# Patient Record
Sex: FEMALE | Race: WHITE | URBAN - METROPOLITAN AREA
[De-identification: names, ages, dates, MRNs, and addresses within clinical notes are randomized per-mention and may not be internally consistent; named-entity substitution may affect disease eponyms.]

---

## 2017-01-25 ENCOUNTER — OUTPATIENT (OUTPATIENT)
Dept: OUTPATIENT SERVICES | Facility: HOSPITAL | Age: 82
LOS: 1 days | Discharge: HOME | End: 2017-01-25

## 2017-01-27 DIAGNOSIS — M06.9 RHEUMATOID ARTHRITIS, UNSPECIFIED: ICD-10-CM

## 2017-02-01 ENCOUNTER — OUTPATIENT (OUTPATIENT)
Dept: OUTPATIENT SERVICES | Facility: HOSPITAL | Age: 82
LOS: 1 days | Discharge: HOME | End: 2017-02-01

## 2017-02-01 DIAGNOSIS — M06.9 RHEUMATOID ARTHRITIS, UNSPECIFIED: ICD-10-CM

## 2017-02-06 ENCOUNTER — APPOINTMENT (OUTPATIENT)
Dept: RHEUMATOLOGY | Facility: CLINIC | Age: 82
End: 2017-02-06

## 2017-02-06 VITALS
SYSTOLIC BLOOD PRESSURE: 120 MMHG | DIASTOLIC BLOOD PRESSURE: 60 MMHG | HEIGHT: 60 IN | BODY MASS INDEX: 21.99 KG/M2 | WEIGHT: 112 LBS

## 2017-02-06 DIAGNOSIS — M48.06 SPINAL STENOSIS, LUMBAR REGION: ICD-10-CM

## 2017-02-08 VITALS
RESPIRATION RATE: 16 BRPM | DIASTOLIC BLOOD PRESSURE: 48 MMHG | TEMPERATURE: 98 F | OXYGEN SATURATION: 100 % | HEART RATE: 62 BPM | SYSTOLIC BLOOD PRESSURE: 124 MMHG

## 2017-02-08 VITALS
RESPIRATION RATE: 16 BRPM | SYSTOLIC BLOOD PRESSURE: 120 MMHG | DIASTOLIC BLOOD PRESSURE: 53 MMHG | TEMPERATURE: 99 F | HEART RATE: 72 BPM | OXYGEN SATURATION: 95 %

## 2017-02-08 DIAGNOSIS — Z90.13 ACQUIRED ABSENCE OF BILATERAL BREASTS AND NIPPLES: Chronic | ICD-10-CM

## 2017-02-08 RX ORDER — RITUXIMAB 10 MG/ML
1000 INJECTION, SOLUTION INTRAVENOUS ONCE
Qty: 0 | Refills: 0 | Status: DISCONTINUED | OUTPATIENT
Start: 2017-02-08 | End: 2017-02-08

## 2017-02-08 RX ORDER — RITUXIMAB 10 MG/ML
1000 INJECTION, SOLUTION INTRAVENOUS ONCE
Qty: 0 | Refills: 0 | Status: COMPLETED | OUTPATIENT
Start: 2017-02-08 | End: 2017-02-08

## 2017-02-08 RX ORDER — ACETAMINOPHEN 500 MG
650 TABLET ORAL ONCE
Qty: 0 | Refills: 0 | Status: COMPLETED | OUTPATIENT
Start: 2017-02-08 | End: 2017-02-08

## 2017-02-08 RX ORDER — CETIRIZINE HYDROCHLORIDE 10 MG/1
10 TABLET ORAL ONCE
Qty: 0 | Refills: 0 | Status: COMPLETED | OUTPATIENT
Start: 2017-02-08 | End: 2017-02-08

## 2017-02-08 RX ADMIN — CETIRIZINE HYDROCHLORIDE 10 MILLIGRAM(S): 10 TABLET ORAL at 11:31

## 2017-02-08 RX ADMIN — Medication 60 MILLIGRAM(S): at 11:31

## 2017-02-08 RX ADMIN — Medication 650 MILLIGRAM(S): at 11:32

## 2017-02-08 RX ADMIN — RITUXIMAB 50 MILLIGRAM(S): 10 INJECTION, SOLUTION INTRAVENOUS at 12:08

## 2017-06-30 ENCOUNTER — APPOINTMENT (OUTPATIENT)
Dept: RHEUMATOLOGY | Facility: CLINIC | Age: 82
End: 2017-06-30

## 2017-07-11 ENCOUNTER — APPOINTMENT (OUTPATIENT)
Dept: RHEUMATOLOGY | Facility: CLINIC | Age: 82
End: 2017-07-11

## 2017-07-11 VITALS
SYSTOLIC BLOOD PRESSURE: 126 MMHG | OXYGEN SATURATION: 98 % | DIASTOLIC BLOOD PRESSURE: 62 MMHG | HEART RATE: 65 BPM | BODY MASS INDEX: 21.99 KG/M2 | WEIGHT: 112 LBS | HEIGHT: 60 IN

## 2017-08-16 ENCOUNTER — RX RENEWAL (OUTPATIENT)
Age: 82
End: 2017-08-16

## 2017-11-10 ENCOUNTER — APPOINTMENT (OUTPATIENT)
Dept: RHEUMATOLOGY | Facility: CLINIC | Age: 82
End: 2017-11-10
Payer: MEDICARE

## 2017-11-10 VITALS
HEIGHT: 60 IN | HEART RATE: 56 BPM | WEIGHT: 111 LBS | SYSTOLIC BLOOD PRESSURE: 122 MMHG | BODY MASS INDEX: 21.79 KG/M2 | DIASTOLIC BLOOD PRESSURE: 72 MMHG | OXYGEN SATURATION: 97 %

## 2017-11-10 PROCEDURE — 99213 OFFICE O/P EST LOW 20 MIN: CPT

## 2017-11-10 RX ORDER — RITUXIMAB 10 MG/ML
500 INJECTION, SOLUTION INTRAVENOUS
Refills: 0 | Status: DISCONTINUED | COMMUNITY
End: 2017-11-10

## 2017-11-10 RX ORDER — RITUXIMAB 10 MG/ML
500 INJECTION, SOLUTION INTRAVENOUS
Qty: 4 | Refills: 1 | Status: DISCONTINUED | COMMUNITY
Start: 2017-08-16 | End: 2017-11-10

## 2018-05-14 ENCOUNTER — APPOINTMENT (OUTPATIENT)
Dept: RHEUMATOLOGY | Facility: CLINIC | Age: 83
End: 2018-05-14
Payer: MEDICARE

## 2018-05-14 VITALS
HEIGHT: 59 IN | DIASTOLIC BLOOD PRESSURE: 74 MMHG | WEIGHT: 108 LBS | BODY MASS INDEX: 21.77 KG/M2 | HEART RATE: 105 BPM | OXYGEN SATURATION: 92 % | SYSTOLIC BLOOD PRESSURE: 157 MMHG

## 2018-05-14 PROCEDURE — 99213 OFFICE O/P EST LOW 20 MIN: CPT

## 2018-11-14 ENCOUNTER — APPOINTMENT (OUTPATIENT)
Dept: RHEUMATOLOGY | Facility: CLINIC | Age: 83
End: 2018-11-14
Payer: MEDICARE

## 2018-11-14 VITALS
DIASTOLIC BLOOD PRESSURE: 82 MMHG | BODY MASS INDEX: 22.58 KG/M2 | WEIGHT: 112 LBS | HEIGHT: 59 IN | SYSTOLIC BLOOD PRESSURE: 129 MMHG | HEART RATE: 62 BPM

## 2018-11-14 PROBLEM — M06.9 RHEUMATOID ARTHRITIS, UNSPECIFIED: Chronic | Status: ACTIVE | Noted: 2017-02-08

## 2018-11-14 PROBLEM — L40.9 PSORIASIS, UNSPECIFIED: Chronic | Status: ACTIVE | Noted: 2017-02-08

## 2018-11-14 PROBLEM — I49.9 CARDIAC ARRHYTHMIA, UNSPECIFIED: Chronic | Status: ACTIVE | Noted: 2017-02-08

## 2018-11-14 PROBLEM — E78.5 HYPERLIPIDEMIA, UNSPECIFIED: Chronic | Status: ACTIVE | Noted: 2017-02-08

## 2018-11-14 PROCEDURE — 99213 OFFICE O/P EST LOW 20 MIN: CPT

## 2018-11-16 ENCOUNTER — INPATIENT (INPATIENT)
Facility: HOSPITAL | Age: 83
LOS: 1 days | Discharge: HOME | End: 2018-11-18
Attending: FAMILY MEDICINE | Admitting: FAMILY MEDICINE
Payer: MEDICARE

## 2018-11-16 VITALS
WEIGHT: 149.91 LBS | SYSTOLIC BLOOD PRESSURE: 136 MMHG | OXYGEN SATURATION: 98 % | RESPIRATION RATE: 22 BRPM | HEART RATE: 50 BPM | DIASTOLIC BLOOD PRESSURE: 59 MMHG

## 2018-11-16 DIAGNOSIS — Z90.13 ACQUIRED ABSENCE OF BILATERAL BREASTS AND NIPPLES: Chronic | ICD-10-CM

## 2018-11-16 LAB
ALBUMIN SERPL ELPH-MCNC: 2.7 G/DL — LOW (ref 3.3–5)
ALBUMIN SERPL ELPH-MCNC: 3.2 G/DL — LOW (ref 3.3–5)
ALP SERPL-CCNC: 56 U/L — SIGNIFICANT CHANGE UP (ref 40–120)
ALP SERPL-CCNC: 88 U/L — SIGNIFICANT CHANGE UP (ref 40–120)
ALT FLD-CCNC: 17 U/L — SIGNIFICANT CHANGE UP (ref 12–78)
ALT FLD-CCNC: 21 U/L — SIGNIFICANT CHANGE UP (ref 12–78)
AMMONIA BLD-MCNC: 26 UMOL/L — SIGNIFICANT CHANGE UP (ref 11–32)
ANION GAP SERPL CALC-SCNC: 7 MMOL/L — SIGNIFICANT CHANGE UP (ref 5–17)
ANION GAP SERPL CALC-SCNC: 9 MMOL/L — SIGNIFICANT CHANGE UP (ref 5–17)
APPEARANCE UR: CLEAR — SIGNIFICANT CHANGE UP
APTT BLD: 29.1 SEC — SIGNIFICANT CHANGE UP (ref 27.5–36.3)
AST SERPL-CCNC: 22 U/L — SIGNIFICANT CHANGE UP (ref 15–37)
AST SERPL-CCNC: 28 U/L — SIGNIFICANT CHANGE UP (ref 15–37)
BACTERIA # UR AUTO: ABNORMAL
BASOPHILS # BLD AUTO: 0.05 K/UL — SIGNIFICANT CHANGE UP (ref 0–0.2)
BASOPHILS NFR BLD AUTO: 0.4 % — SIGNIFICANT CHANGE UP (ref 0–2)
BILIRUB SERPL-MCNC: 0.4 MG/DL — SIGNIFICANT CHANGE UP (ref 0.2–1.2)
BILIRUB SERPL-MCNC: 0.4 MG/DL — SIGNIFICANT CHANGE UP (ref 0.2–1.2)
BILIRUB UR-MCNC: NEGATIVE — SIGNIFICANT CHANGE UP
BUN SERPL-MCNC: 12 MG/DL — SIGNIFICANT CHANGE UP (ref 7–23)
BUN SERPL-MCNC: 20 MG/DL — SIGNIFICANT CHANGE UP (ref 7–23)
CALCIUM SERPL-MCNC: 7.1 MG/DL — LOW (ref 8.5–10.1)
CALCIUM SERPL-MCNC: 7.8 MG/DL — LOW (ref 8.5–10.1)
CHLORIDE SERPL-SCNC: 100 MMOL/L — SIGNIFICANT CHANGE UP (ref 96–108)
CHLORIDE SERPL-SCNC: 107 MMOL/L — SIGNIFICANT CHANGE UP (ref 96–108)
CK SERPL-CCNC: 152 U/L — SIGNIFICANT CHANGE UP (ref 26–192)
CO2 SERPL-SCNC: 24 MMOL/L — SIGNIFICANT CHANGE UP (ref 22–31)
CO2 SERPL-SCNC: 24 MMOL/L — SIGNIFICANT CHANGE UP (ref 22–31)
COLOR SPEC: YELLOW — SIGNIFICANT CHANGE UP
CREAT SERPL-MCNC: 0.69 MG/DL — SIGNIFICANT CHANGE UP (ref 0.5–1.3)
CREAT SERPL-MCNC: 1 MG/DL — SIGNIFICANT CHANGE UP (ref 0.5–1.3)
DIFF PNL FLD: NEGATIVE — SIGNIFICANT CHANGE UP
EOSINOPHIL # BLD AUTO: 0.22 K/UL — SIGNIFICANT CHANGE UP (ref 0–0.5)
EOSINOPHIL NFR BLD AUTO: 1.8 % — SIGNIFICANT CHANGE UP (ref 0–6)
EPI CELLS # UR: SIGNIFICANT CHANGE UP
GLUCOSE SERPL-MCNC: 163 MG/DL — HIGH (ref 70–99)
GLUCOSE SERPL-MCNC: 77 MG/DL — SIGNIFICANT CHANGE UP (ref 70–99)
GLUCOSE UR QL: NEGATIVE MG/DL — SIGNIFICANT CHANGE UP
GRAN CASTS # UR COMP ASSIST: ABNORMAL /LPF
HCT VFR BLD CALC: 39.9 % — SIGNIFICANT CHANGE UP (ref 34.5–45)
HCT VFR BLD CALC: 48.9 % — HIGH (ref 34.5–45)
HGB BLD-MCNC: 13.1 G/DL — SIGNIFICANT CHANGE UP (ref 11.5–15.5)
HGB BLD-MCNC: 15.9 G/DL — HIGH (ref 11.5–15.5)
HYALINE CASTS # UR AUTO: ABNORMAL /LPF
IMM GRANULOCYTES NFR BLD AUTO: 0.7 % — SIGNIFICANT CHANGE UP (ref 0–1.5)
INR BLD: 1.14 RATIO — SIGNIFICANT CHANGE UP (ref 0.88–1.16)
KETONES UR-MCNC: ABNORMAL
LACTATE SERPL-SCNC: 1.3 MMOL/L — SIGNIFICANT CHANGE UP (ref 0.7–2)
LEUKOCYTE ESTERASE UR-ACNC: ABNORMAL
LIDOCAIN IGE QN: 267 U/L — SIGNIFICANT CHANGE UP (ref 73–393)
LYMPHOCYTES # BLD AUTO: 1.14 K/UL — SIGNIFICANT CHANGE UP (ref 1–3.3)
LYMPHOCYTES # BLD AUTO: 9.5 % — LOW (ref 13–44)
MCHC RBC-ENTMCNC: 30 PG — SIGNIFICANT CHANGE UP (ref 27–34)
MCHC RBC-ENTMCNC: 30.1 PG — SIGNIFICANT CHANGE UP (ref 27–34)
MCHC RBC-ENTMCNC: 32.5 GM/DL — SIGNIFICANT CHANGE UP (ref 32–36)
MCHC RBC-ENTMCNC: 32.8 GM/DL — SIGNIFICANT CHANGE UP (ref 32–36)
MCV RBC AUTO: 91.5 FL — SIGNIFICANT CHANGE UP (ref 80–100)
MCV RBC AUTO: 92.4 FL — SIGNIFICANT CHANGE UP (ref 80–100)
MONOCYTES # BLD AUTO: 0.98 K/UL — HIGH (ref 0–0.9)
MONOCYTES NFR BLD AUTO: 8.1 % — SIGNIFICANT CHANGE UP (ref 2–14)
NEUTROPHILS # BLD AUTO: 9.58 K/UL — HIGH (ref 1.8–7.4)
NEUTROPHILS NFR BLD AUTO: 79.5 % — HIGH (ref 43–77)
NITRITE UR-MCNC: NEGATIVE — SIGNIFICANT CHANGE UP
NRBC # BLD: 0 /100 WBCS — SIGNIFICANT CHANGE UP (ref 0–0)
NRBC # BLD: 0 /100 WBCS — SIGNIFICANT CHANGE UP (ref 0–0)
NT-PROBNP SERPL-SCNC: 448 PG/ML — SIGNIFICANT CHANGE UP (ref 0–450)
PH UR: 5 — SIGNIFICANT CHANGE UP (ref 5–8)
PLATELET # BLD AUTO: 155 K/UL — SIGNIFICANT CHANGE UP (ref 150–400)
PLATELET # BLD AUTO: 181 K/UL — SIGNIFICANT CHANGE UP (ref 150–400)
POTASSIUM SERPL-MCNC: 4.4 MMOL/L — SIGNIFICANT CHANGE UP (ref 3.5–5.3)
POTASSIUM SERPL-MCNC: 4.5 MMOL/L — SIGNIFICANT CHANGE UP (ref 3.5–5.3)
POTASSIUM SERPL-SCNC: 4.4 MMOL/L — SIGNIFICANT CHANGE UP (ref 3.5–5.3)
POTASSIUM SERPL-SCNC: 4.5 MMOL/L — SIGNIFICANT CHANGE UP (ref 3.5–5.3)
PROT SERPL-MCNC: 5.2 GM/DL — LOW (ref 6–8.3)
PROT SERPL-MCNC: 6 GM/DL — SIGNIFICANT CHANGE UP (ref 6–8.3)
PROT UR-MCNC: 30 MG/DL
PROTHROM AB SERPL-ACNC: 12.7 SEC — SIGNIFICANT CHANGE UP (ref 10–12.9)
RBC # BLD: 4.36 M/UL — SIGNIFICANT CHANGE UP (ref 3.8–5.2)
RBC # BLD: 5.29 M/UL — HIGH (ref 3.8–5.2)
RBC # FLD: 13.5 % — SIGNIFICANT CHANGE UP (ref 10.3–14.5)
RBC # FLD: 13.5 % — SIGNIFICANT CHANGE UP (ref 10.3–14.5)
RBC CASTS # UR COMP ASSIST: SIGNIFICANT CHANGE UP /HPF (ref 0–4)
SODIUM SERPL-SCNC: 133 MMOL/L — LOW (ref 135–145)
SODIUM SERPL-SCNC: 138 MMOL/L — SIGNIFICANT CHANGE UP (ref 135–145)
SP GR SPEC: 1.02 — SIGNIFICANT CHANGE UP (ref 1.01–1.02)
T3 SERPL-MCNC: 62 NG/DL — LOW (ref 80–200)
T4 AB SER-ACNC: 4.4 UG/DL — LOW (ref 4.6–12)
TROPONIN I SERPL-MCNC: <0.015 NG/ML — SIGNIFICANT CHANGE UP (ref 0.01–0.04)
TSH SERPL-MCNC: 12.1 UU/ML — HIGH (ref 0.34–4.82)
UROBILINOGEN FLD QL: NEGATIVE MG/DL — SIGNIFICANT CHANGE UP
WBC # BLD: 12.05 K/UL — HIGH (ref 3.8–10.5)
WBC # BLD: 5.87 K/UL — SIGNIFICANT CHANGE UP (ref 3.8–10.5)
WBC # FLD AUTO: 12.05 K/UL — HIGH (ref 3.8–10.5)
WBC # FLD AUTO: 5.87 K/UL — SIGNIFICANT CHANGE UP (ref 3.8–10.5)
WBC UR QL: SIGNIFICANT CHANGE UP

## 2018-11-16 PROCEDURE — 99285 EMERGENCY DEPT VISIT HI MDM: CPT

## 2018-11-16 PROCEDURE — 95816 EEG AWAKE AND DROWSY: CPT | Mod: 26

## 2018-11-16 PROCEDURE — 70450 CT HEAD/BRAIN W/O DYE: CPT | Mod: 26

## 2018-11-16 PROCEDURE — 93010 ELECTROCARDIOGRAM REPORT: CPT

## 2018-11-16 PROCEDURE — 71045 X-RAY EXAM CHEST 1 VIEW: CPT | Mod: 26

## 2018-11-16 PROCEDURE — 93306 TTE W/DOPPLER COMPLETE: CPT | Mod: 26

## 2018-11-16 PROCEDURE — 72125 CT NECK SPINE W/O DYE: CPT | Mod: 26

## 2018-11-16 RX ORDER — ENOXAPARIN SODIUM 100 MG/ML
40 INJECTION SUBCUTANEOUS EVERY 24 HOURS
Qty: 0 | Refills: 0 | Status: DISCONTINUED | OUTPATIENT
Start: 2018-11-16 | End: 2018-11-18

## 2018-11-16 RX ORDER — PINDOLOL 10 MG
1 TABLET ORAL
Qty: 0 | Refills: 0 | COMMUNITY

## 2018-11-16 RX ORDER — ESCITALOPRAM OXALATE 10 MG/1
1 TABLET, FILM COATED ORAL
Qty: 0 | Refills: 0 | COMMUNITY

## 2018-11-16 RX ORDER — AZITHROMYCIN 500 MG/1
500 TABLET, FILM COATED ORAL ONCE
Qty: 0 | Refills: 0 | Status: COMPLETED | OUTPATIENT
Start: 2018-11-16 | End: 2018-11-16

## 2018-11-16 RX ORDER — OMEGA-3 ACID ETHYL ESTERS 1 G
1 CAPSULE ORAL
Qty: 0 | Refills: 0 | COMMUNITY

## 2018-11-16 RX ORDER — PANTOPRAZOLE SODIUM 20 MG/1
40 TABLET, DELAYED RELEASE ORAL
Qty: 0 | Refills: 0 | Status: DISCONTINUED | OUTPATIENT
Start: 2018-11-16 | End: 2018-11-18

## 2018-11-16 RX ORDER — OMEPRAZOLE 10 MG/1
1 CAPSULE, DELAYED RELEASE ORAL
Qty: 0 | Refills: 0 | COMMUNITY

## 2018-11-16 RX ORDER — ASPIRIN/CALCIUM CARB/MAGNESIUM 324 MG
1 TABLET ORAL
Qty: 0 | Refills: 0 | COMMUNITY

## 2018-11-16 RX ORDER — CEFTRIAXONE 500 MG/1
1000 INJECTION, POWDER, FOR SOLUTION INTRAMUSCULAR; INTRAVENOUS ONCE
Qty: 0 | Refills: 0 | Status: COMPLETED | OUTPATIENT
Start: 2018-11-16 | End: 2018-11-16

## 2018-11-16 RX ORDER — SIMVASTATIN 20 MG/1
1 TABLET, FILM COATED ORAL
Qty: 0 | Refills: 0 | COMMUNITY

## 2018-11-16 RX ORDER — SODIUM CHLORIDE 9 MG/ML
2500 INJECTION INTRAMUSCULAR; INTRAVENOUS; SUBCUTANEOUS ONCE
Qty: 0 | Refills: 0 | Status: COMPLETED | OUTPATIENT
Start: 2018-11-16 | End: 2018-11-16

## 2018-11-16 RX ORDER — MAGNESIUM CARBONATE 54 MG/5 ML
500 LIQUID (ML) ORAL
Qty: 0 | Refills: 0 | COMMUNITY

## 2018-11-16 RX ORDER — ASCORBIC ACID 60 MG
2 TABLET,CHEWABLE ORAL
Qty: 0 | Refills: 0 | COMMUNITY

## 2018-11-16 RX ORDER — USTEKINUMAB 45 MG/.5ML
0 INJECTION, SOLUTION SUBCUTANEOUS
Qty: 0 | Refills: 0 | COMMUNITY

## 2018-11-16 RX ORDER — CHOLECALCIFEROL (VITAMIN D3) 125 MCG
2 CAPSULE ORAL
Qty: 0 | Refills: 0 | COMMUNITY

## 2018-11-16 RX ORDER — ACETAMINOPHEN 500 MG
650 TABLET ORAL EVERY 6 HOURS
Qty: 0 | Refills: 0 | Status: DISCONTINUED | OUTPATIENT
Start: 2018-11-16 | End: 2018-11-18

## 2018-11-16 RX ORDER — CHOLECALCIFEROL (VITAMIN D3) 125 MCG
2000 CAPSULE ORAL DAILY
Qty: 0 | Refills: 0 | Status: DISCONTINUED | OUTPATIENT
Start: 2018-11-16 | End: 2018-11-18

## 2018-11-16 RX ORDER — ESCITALOPRAM OXALATE 10 MG/1
20 TABLET, FILM COATED ORAL DAILY
Qty: 0 | Refills: 0 | Status: DISCONTINUED | OUTPATIENT
Start: 2018-11-16 | End: 2018-11-18

## 2018-11-16 RX ORDER — ASPIRIN/CALCIUM CARB/MAGNESIUM 324 MG
81 TABLET ORAL DAILY
Qty: 0 | Refills: 0 | Status: DISCONTINUED | OUTPATIENT
Start: 2018-11-16 | End: 2018-11-18

## 2018-11-16 RX ADMIN — Medication 1 TABLET(S): at 12:20

## 2018-11-16 RX ADMIN — AZITHROMYCIN 255 MILLIGRAM(S): 500 TABLET, FILM COATED ORAL at 02:29

## 2018-11-16 RX ADMIN — Medication 2000 UNIT(S): at 12:25

## 2018-11-16 RX ADMIN — CEFTRIAXONE 1000 MILLIGRAM(S): 500 INJECTION, POWDER, FOR SOLUTION INTRAMUSCULAR; INTRAVENOUS at 02:24

## 2018-11-16 RX ADMIN — ENOXAPARIN SODIUM 40 MILLIGRAM(S): 100 INJECTION SUBCUTANEOUS at 12:20

## 2018-11-16 RX ADMIN — ESCITALOPRAM OXALATE 20 MILLIGRAM(S): 10 TABLET, FILM COATED ORAL at 12:20

## 2018-11-16 RX ADMIN — SODIUM CHLORIDE 5000 MILLILITER(S): 9 INJECTION INTRAMUSCULAR; INTRAVENOUS; SUBCUTANEOUS at 01:48

## 2018-11-16 RX ADMIN — AZITHROMYCIN 500 MILLIGRAM(S): 500 TABLET, FILM COATED ORAL at 03:29

## 2018-11-16 RX ADMIN — SODIUM CHLORIDE 2500 MILLILITER(S): 9 INJECTION INTRAMUSCULAR; INTRAVENOUS; SUBCUTANEOUS at 02:30

## 2018-11-16 RX ADMIN — PANTOPRAZOLE SODIUM 40 MILLIGRAM(S): 20 TABLET, DELAYED RELEASE ORAL at 18:54

## 2018-11-16 RX ADMIN — Medication 81 MILLIGRAM(S): at 12:20

## 2018-11-16 NOTE — ED ADULT NURSE NOTE - NSIMPLEMENTINTERV_GEN_ALL_ED
Implemented All Fall with Harm Risk Interventions:  [place] to call system. Call bell, personal items and telephone within reach. Instruct patient to call for assistance. Room bathroom lighting operational. Non-slip footwear when patient is off stretcher. Physically safe environment: no spills, clutter or unnecessary equipment. Stretcher in lowest position, wheels locked, appropriate side rails in place. Provide visual cue, wrist band, yellow gown, etc. Monitor gait and stability. Monitor for mental status changes and reorient to person, place, and time. Review medications for side effects contributing to fall risk. Reinforce activity limits and safety measures with patient and family. Provide visual clues: red socks.

## 2018-11-16 NOTE — H&P ADULT - RS GEN PE MLT RESP DETAILS PC
breath sounds equal/good air movement/no rales/no chest wall tenderness/clear to auscultation bilaterally/no intercostal retractions/respirations non-labored/no rhonchi/no wheezes

## 2018-11-16 NOTE — ED PROVIDER NOTE - PROGRESS NOTE DETAILS
S/o to Dr. Pugh pending ED work up and admission. Attending Choudhary, Updated pt and  on results of tests.  Pt now more awake per .  D/w Dr. Gottlieb for admission

## 2018-11-16 NOTE — H&P ADULT - ASSESSMENT
89 y/o F PMHx psoriasis, rheumatoid arthritis, GERD, depression, HTN, HLD presented to ED after fall last night    #Syncope, fall  #Bradycardia  #Hypothermia  -Admit to tele  -Check TTE, carotid doppler  -Possible side effect of medicine?- recent nyquil use prior to fall  -Cardio consulted, appreciate recs  -Neuro consulted, appreciate recs  -CXR prelim read clear lungs, UA negative, will defer antibiotics at this time- f/u blood and urine cx    #HTN  -SBP in 90s-100s in ED  -Observe off Pindolol (may be contributing to bradycardia)  -Appreciate cardio recs    #HLD  -Diet controlled    #Depression  -Continue lexapro    #GERD  -Continue protonix BID    #DVT ppx  -Lovenox 40 daily  IMPROVE VTE Individual Risk Assessment    RISK                                                                Points    [  ] Previous VTE                                                  3    [  ] Thrombophilia                                               2    [  ] Lower limb paralysis                                      2        (unable to hold up >15 seconds)      [  ] Current Cancer                                              2         (within 6 months)    [  ] Immobilization > 24 hrs                                1    [  ] ICU/CCU stay > 24 hours                              1    [ x ] Age > 60                                                      1    IMPROVE VTE Score _____1____    Total time spent on admission: 75 minutes

## 2018-11-16 NOTE — ED PROVIDER NOTE - CARE PLAN
Principal Discharge DX:	Syncope, unspecified syncope type  Secondary Diagnosis:	Hypothermia, initial encounter

## 2018-11-16 NOTE — ED PROVIDER NOTE - OBJECTIVE STATEMENT
Patient is a 88 yo female who went to sleep at 11:30pm last night; awoke to go to the bathroom,  her loud "thud"- found wife on the ground; no signs of trauma; wife had ?hydrogen peroxide in hand; took nyquil prior to bed; seen immediately upon arrival- patient arrives oriented to person, place, not time; able to move arms/legs; no complaints of pain; no chest pain; no sob; no abdominal pain; no weakness in arms or legs; no report of seizure activity per  at bedside.

## 2018-11-16 NOTE — H&P ADULT - NEUROLOGICAL DETAILS
cranial nerves intact/normal strength/sensation intact/no spontaneous movement/responds to verbal commands/alert and oriented x 3

## 2018-11-16 NOTE — H&P ADULT - NSHPSOCIALHISTORY_GEN_ALL_CORE
, lives at home with spouse, former smoker- quit 1985, drinks wine and some bourbon daily, denies drug use

## 2018-11-16 NOTE — H&P ADULT - HISTORY OF PRESENT ILLNESS
88 y/o F PMHx psoriasis, rheumatoid arthritis, GERD, depression, HTN, HLD presented to ED after fall last night. Patient states she was packing for a trip last night and fell. Patient denies tripping, denies feeling lightheaded/dizzy prior to fall. As per  at bedside, patient recently had a cold, took "[employer] brand Nyquil" for first time approximately 10 minutes prior to the fall. Patient states her memory has been a little "off" for the past few months. Patient currently denies any CP, SOB, palpitations, abd pain, N/V/D/C.    In ED, patient was found to be hypothermic (T- 94.9 F). Patient had CT head which was negative for any acute changes. Patient also had CT cervical spine which showed canal stenosis C5-C7. CXR done- prelim read- clear lungs. EKG done showing sinus bradycardia.

## 2018-11-16 NOTE — ED ADULT NURSE NOTE - CHIEF COMPLAINT QUOTE
pt presents to ED bIBA due to fall at home found by  in the bathroom confused pt has had a cold x 3 days took nyquil prior to bed tonight TA called no code stroke

## 2018-11-16 NOTE — ED PROVIDER NOTE - MEDICAL DECISION MAKING DETAILS
88 yo female with fall; AMS; NIHSS: 0; immediately CTH in setting of AMS/fall; EKG, CXR, labs, urine; admit

## 2018-11-16 NOTE — ED ADULT NURSE NOTE - OBJECTIVE STATEMENT
BIBA s/p fall. Pts  reports pt woke up went to bathroom and he heard a crash.  found pt on laying on bathroom floor with a bottle of perioxide in her hand. Unaware of LOC. Confusion noted upon assessment. Pt would barely open her eyes, answer questions or follow commands. Incontience of BM noted. Cleaned & freshened up. CT scan & xray performed. Trauma alert initiated. Pt became more lucid after exams. Now A&Ox3. Unable to recall fall.  report pt had Nyquil around 2345 before bed for a cold. BIBA s/p fall. Pts  reports pt woke up went to bathroom and he heard a crash.  found pt on laying on bathroom floor with a bottle of perioxide in her hand. Unaware of LOC. Confusion noted upon assessment. Pt would barely open her eyes, answer questions or follow commands. Incontience of BM noted. Cleaned & freshened up. CT scan & xray performed. Trauma alert initiated. Pt became more lucid after exams. Now A&Ox3. Unable to recall fall.  report pt had Nyquil around 2345 before bed for a cold. See trauma flowsheet

## 2018-11-16 NOTE — ED ADULT NURSE REASSESSMENT NOTE - NS ED NURSE REASSESS COMMENT FT1
Pt in stretcher, gown on. Alert & responsive. Oriented x4. VSS. No distress noted. Quietly resting.  at bedside. Awaiting admission. Sign out given to MAURICE Perdue

## 2018-11-17 LAB
BASOPHILS # BLD AUTO: 0.01 K/UL — SIGNIFICANT CHANGE UP (ref 0–0.2)
BASOPHILS NFR BLD AUTO: 0.2 % — SIGNIFICANT CHANGE UP (ref 0–2)
CHOLEST SERPL-MCNC: 168 MG/DL — SIGNIFICANT CHANGE UP (ref 10–199)
CULTURE RESULTS: NO GROWTH — SIGNIFICANT CHANGE UP
EOSINOPHIL # BLD AUTO: 0.16 K/UL — SIGNIFICANT CHANGE UP (ref 0–0.5)
EOSINOPHIL NFR BLD AUTO: 3.3 % — SIGNIFICANT CHANGE UP (ref 0–6)
HCT VFR BLD CALC: 37.2 % — SIGNIFICANT CHANGE UP (ref 34.5–45)
HDLC SERPL-MCNC: 76 MG/DL — SIGNIFICANT CHANGE UP
HGB BLD-MCNC: 12.1 G/DL — SIGNIFICANT CHANGE UP (ref 11.5–15.5)
IMM GRANULOCYTES NFR BLD AUTO: 0.2 % — SIGNIFICANT CHANGE UP (ref 0–1.5)
LIPID PNL WITH DIRECT LDL SERPL: 80 MG/DL — SIGNIFICANT CHANGE UP
LYMPHOCYTES # BLD AUTO: 0.85 K/UL — LOW (ref 1–3.3)
LYMPHOCYTES # BLD AUTO: 17.3 % — SIGNIFICANT CHANGE UP (ref 13–44)
MAGNESIUM SERPL-MCNC: 2 MG/DL — SIGNIFICANT CHANGE UP (ref 1.6–2.6)
MCHC RBC-ENTMCNC: 29 PG — SIGNIFICANT CHANGE UP (ref 27–34)
MCHC RBC-ENTMCNC: 32.5 GM/DL — SIGNIFICANT CHANGE UP (ref 32–36)
MCV RBC AUTO: 89.2 FL — SIGNIFICANT CHANGE UP (ref 80–100)
MONOCYTES # BLD AUTO: 0.33 K/UL — SIGNIFICANT CHANGE UP (ref 0–0.9)
MONOCYTES NFR BLD AUTO: 6.7 % — SIGNIFICANT CHANGE UP (ref 2–14)
NEUTROPHILS # BLD AUTO: 3.55 K/UL — SIGNIFICANT CHANGE UP (ref 1.8–7.4)
NEUTROPHILS NFR BLD AUTO: 72.3 % — SIGNIFICANT CHANGE UP (ref 43–77)
NRBC # BLD: 0 /100 WBCS — SIGNIFICANT CHANGE UP (ref 0–0)
PHOSPHATE SERPL-MCNC: 1.9 MG/DL — LOW (ref 2.5–4.5)
PLATELET # BLD AUTO: 160 K/UL — SIGNIFICANT CHANGE UP (ref 150–400)
RAPID RVP RESULT: DETECTED
RBC # BLD: 4.17 M/UL — SIGNIFICANT CHANGE UP (ref 3.8–5.2)
RBC # FLD: 13.9 % — SIGNIFICANT CHANGE UP (ref 10.3–14.5)
RV+EV RNA SPEC QL NAA+PROBE: DETECTED
SPECIMEN SOURCE: SIGNIFICANT CHANGE UP
TOTAL CHOLESTEROL/HDL RATIO MEASUREMENT: 2.2 RATIO — LOW (ref 3.3–7.1)
TRIGL SERPL-MCNC: 60 MG/DL — SIGNIFICANT CHANGE UP (ref 10–149)
WBC # BLD: 4.91 K/UL — SIGNIFICANT CHANGE UP (ref 3.8–10.5)
WBC # FLD AUTO: 4.91 K/UL — SIGNIFICANT CHANGE UP (ref 3.8–10.5)

## 2018-11-17 PROCEDURE — 71275 CT ANGIOGRAPHY CHEST: CPT | Mod: 26

## 2018-11-17 PROCEDURE — 99223 1ST HOSP IP/OBS HIGH 75: CPT

## 2018-11-17 RX ORDER — LACTOBACILLUS ACIDOPHILUS 100MM CELL
1 CAPSULE ORAL
Qty: 0 | Refills: 0 | Status: DISCONTINUED | OUTPATIENT
Start: 2018-11-17 | End: 2018-11-18

## 2018-11-17 RX ORDER — AZITHROMYCIN 500 MG/1
TABLET, FILM COATED ORAL
Qty: 0 | Refills: 0 | Status: DISCONTINUED | OUTPATIENT
Start: 2018-11-17 | End: 2018-11-18

## 2018-11-17 RX ORDER — AZITHROMYCIN 500 MG/1
500 TABLET, FILM COATED ORAL ONCE
Qty: 0 | Refills: 0 | Status: COMPLETED | OUTPATIENT
Start: 2018-11-17 | End: 2018-11-17

## 2018-11-17 RX ORDER — CEFTRIAXONE 500 MG/1
1000 INJECTION, POWDER, FOR SOLUTION INTRAMUSCULAR; INTRAVENOUS ONCE
Qty: 0 | Refills: 0 | Status: COMPLETED | OUTPATIENT
Start: 2018-11-17 | End: 2018-11-17

## 2018-11-17 RX ORDER — LEVOTHYROXINE SODIUM 125 MCG
25 TABLET ORAL DAILY
Qty: 0 | Refills: 0 | Status: DISCONTINUED | OUTPATIENT
Start: 2018-11-17 | End: 2018-11-18

## 2018-11-17 RX ORDER — AZITHROMYCIN 500 MG/1
500 TABLET, FILM COATED ORAL EVERY 24 HOURS
Qty: 0 | Refills: 0 | Status: DISCONTINUED | OUTPATIENT
Start: 2018-11-18 | End: 2018-11-18

## 2018-11-17 RX ORDER — CEFTRIAXONE 500 MG/1
INJECTION, POWDER, FOR SOLUTION INTRAMUSCULAR; INTRAVENOUS
Qty: 0 | Refills: 0 | Status: DISCONTINUED | OUTPATIENT
Start: 2018-11-17 | End: 2018-11-18

## 2018-11-17 RX ORDER — FLUTICASONE PROPIONATE 50 MCG
1 SPRAY, SUSPENSION NASAL
Qty: 0 | Refills: 0 | Status: DISCONTINUED | OUTPATIENT
Start: 2018-11-17 | End: 2018-11-18

## 2018-11-17 RX ORDER — CEFTRIAXONE 500 MG/1
1000 INJECTION, POWDER, FOR SOLUTION INTRAMUSCULAR; INTRAVENOUS EVERY 24 HOURS
Qty: 0 | Refills: 0 | Status: DISCONTINUED | OUTPATIENT
Start: 2018-11-18 | End: 2018-11-18

## 2018-11-17 RX ADMIN — Medication 2000 UNIT(S): at 12:23

## 2018-11-17 RX ADMIN — Medication 30 MILLILITER(S): at 00:11

## 2018-11-17 RX ADMIN — AZITHROMYCIN 255 MILLIGRAM(S): 500 TABLET, FILM COATED ORAL at 12:00

## 2018-11-17 RX ADMIN — PANTOPRAZOLE SODIUM 40 MILLIGRAM(S): 20 TABLET, DELAYED RELEASE ORAL at 17:35

## 2018-11-17 RX ADMIN — Medication 81 MILLIGRAM(S): at 12:23

## 2018-11-17 RX ADMIN — CEFTRIAXONE 1000 MILLIGRAM(S): 500 INJECTION, POWDER, FOR SOLUTION INTRAMUSCULAR; INTRAVENOUS at 12:23

## 2018-11-17 RX ADMIN — ENOXAPARIN SODIUM 40 MILLIGRAM(S): 100 INJECTION SUBCUTANEOUS at 12:23

## 2018-11-17 RX ADMIN — Medication 1 TABLET(S): at 12:23

## 2018-11-17 RX ADMIN — PANTOPRAZOLE SODIUM 40 MILLIGRAM(S): 20 TABLET, DELAYED RELEASE ORAL at 05:29

## 2018-11-17 RX ADMIN — ESCITALOPRAM OXALATE 20 MILLIGRAM(S): 10 TABLET, FILM COATED ORAL at 12:24

## 2018-11-17 RX ADMIN — Medication 1 TABLET(S): at 17:35

## 2018-11-17 RX ADMIN — Medication 1 SPRAY(S): at 17:35

## 2018-11-17 RX ADMIN — Medication 62.5 MILLIMOLE(S): at 12:24

## 2018-11-17 NOTE — PROGRESS NOTE ADULT - SUBJECTIVE AND OBJECTIVE BOX
INTERVAL HPI/OVERNIGHT EVENTS:  88 y/o F PMHx psoriasis, rheumatoid arthritis, GERD, depression, HTN, HLD presented to ED after fall last night. Patient states she was packing for a trip last night and fell. Patient denies tripping, denies feeling lightheaded/dizzy prior to fall. As per  at bedside, patient recently had a cold, took "[employer] brand Nyquil" for first time approximately 10 minutes prior to the fall. Patient states her memory has been a little "off" for the past few months. Patient currently denies any CP, SOB, palpitations, abd pain, N/V/D/C.    In ED, patient was found to be hypothermic (T- 94.9 F). Patient had CT head which was negative for any acute changes. Patient also had CT cervical spine which showed canal stenosis C5-C7. CXR done- prelim read- clear lungs. EKG done showing sinus bradycardia.    18- Patient seen and examined at bedside. States she feels well, complains of nasal congestion, no other complaints    MEDICATIONS  (STANDING):  aspirin enteric coated 81 milliGRAM(s) Oral daily  azithromycin  IVPB      azithromycin  IVPB 500 milliGRAM(s) IV Intermittent once  cefTRIAXone Injectable.      cholecalciferol 2000 Unit(s) Oral daily  enoxaparin Injectable 40 milliGRAM(s) SubCutaneous every 24 hours  escitalopram 20 milliGRAM(s) Oral daily  levothyroxine 25 MICROGram(s) Oral daily  multivitamin 1 Tablet(s) Oral daily  pantoprazole    Tablet 40 milliGRAM(s) Oral two times a day    MEDICATIONS  (PRN):  acetaminophen   Tablet .. 650 milliGRAM(s) Oral every 6 hours PRN Moderate Pain (4 - 6)      Allergies    No Known Allergies    Intolerances    simvastatin (Rash)    ROS:  CONSTITUTIONAL: No weakness, fevers or chills  EYES/ENT: No visual changes;  No vertigo or throat pain, +nasal congestion  NECK: No pain or stiffness  RESPIRATORY: No cough, wheezing, hemoptysis; No shortness of breath  CARDIOVASCULAR: No chest pain or palpitations  GASTROINTESTINAL: No abdominal or epigastric pain. No nausea, vomiting, or hematemesis; No diarrhea or constipation. No melena or hematochezia.  GENITOURINARY: No dysuria, frequency or hematuria  NEUROLOGICAL: No numbness or weakness  SKIN: No itching, burning, rashes, or lesions   All other review of systems is negative unless indicated above.    Vital Signs Last 24 Hrs  T(C): 37.1 (2018 05:21), Max: 37.1 (2018 05:21)  T(F): 98.7 (2018 05:21), Max: 98.7 (2018 05:21)  HR: 68 (2018 05:21) (68 - 74)  BP: 123/48 (2018 05:21) (123/48 - 126/56)  BP(mean): --  RR: 17 (2018 20:54) (17 - 17)  SpO2: 92% (2018 05:21) (92% - 96%)    Physical Exam:  General: WN/WD NAD  Neurology: A&Ox3, nonfocal, CRUZ x 4  Respiratory: CTA B/L  CV: RRR, S1S2, no murmurs, rubs or gallops  Abdominal: Soft, NT, ND +BS  Extremities: No edema, + peripheral pulses      LABS:                        12.1   4.91  )-----------( 160      ( 2018 04:39 )             37.2     11-16    138  |  107  |  12  ----------------------------<  77  4.5   |  24  |  0.69    Ca    7.1<L>      2018 12:09  Phos  1.9     11-17  Mg     2.0     11-17    TPro  5.2<L>  /  Alb  2.7<L>  /  TBili  0.4  /  DBili  x   /  AST  22  /  ALT  17  /  AlkPhos  56  11-16    PT/INR - ( 2018 01:15 )   PT: 12.7 sec;   INR: 1.14 ratio         PTT - ( 2018 01:15 )  PTT:29.1 sec  Urinalysis Basic - ( 2018 05:09 )    Color: Yellow / Appearance: Clear / S.025 / pH: x  Gluc: x / Ketone: Trace  / Bili: Negative / Urobili: Negative mg/dL   Blood: x / Protein: 30 mg/dL / Nitrite: Negative   Leuk Esterase: Trace / RBC: 0-2 /HPF / WBC 3-5   Sq Epi: x / Non Sq Epi: Few / Bacteria: Few        RADIOLOGY & ADDITIONAL TESTS:

## 2018-11-17 NOTE — PROGRESS NOTE ADULT - ASSESSMENT
89 y/o F PMHx psoriasis, rheumatoid arthritis, GERD, depression, HTN, HLD presented to ED after fall last night    #Syncope, fall  #Bradycardia  #Hypothermia  #Pneumonia  -Check TTE, carotid doppler  -Possible side effect of medicine?- recent nyquil use prior to fall  -Cardio consulted, appreciate recs  -Neuro consulted, appreciate recs- EEG done- normal study  -CXR prelim read clear lungs, UA negative, will defer antibiotics at this time- f/u blood and urine cx- neg  -CTA Chest done showing no PE, but positive for LARA PNA- likely CAP- started on abx    #HTN  -SBP in 90s-100s in ED  -Observe off Pindolol (may be contributing to bradycardia)  -Appreciate cardio recs    #HLD  -Diet controlled    #Depression  -Continue lexapro    #GERD  -Continue protonix BID    #DVT ppx  -Lovenox 40 daily 89 y/o F PMHx psoriasis, rheumatoid arthritis, GERD, depression, HTN, HLD presented to ED after fall last night    #Syncope, fall  #Bradycardia  #Hypothermia  #Pneumonia  -Check TTE, carotid doppler  -Possible side effect of medicine?- recent nyquil use prior to fall  -Cardio consulted, appreciate recs  -Neuro consulted, appreciate recs- EEG done- normal study  -CXR prelim read clear lungs, UA negative, will defer antibiotics at this time- f/u blood and urine cx- neg  -CTA Chest done showing no PE, but positive for LARA PNA- likely CAP- started on abx    #Elevated TSH  -Low T3 and T4  -Started on levothyroxine 25mcg daily  -Hypothyroidism may have contributed to syncope/bradycardia    #HTN  -SBP in 90s-100s in ED  -Observe off Pindolol (may be contributing to bradycardia)  -Appreciate cardio recs    #HLD  -Diet controlled    #Depression  -Continue lexapro    #GERD  -Continue protonix BID    #DVT ppx  -Lovenox 40 daily

## 2018-11-17 NOTE — PROGRESS NOTE ADULT - ASSESSMENT
Ms. Pritchard is an 89 year old woman who had a witnessed fall/loss of consciousness.  There was no witnessed convulsion but she did have more prolonged confusion than would be expected to syncope.  Prolonged confusion may be related to acute illness (pneumonia) and sedating medication.   EEG is unremarkable.  At this time I would not recommend any neurological medications such as anti-convulsants and would not recommend any additional neurological testing.  Please reconsult if neurology can be of further assistance.

## 2018-11-17 NOTE — CONSULT NOTE ADULT - SUBJECTIVE AND OBJECTIVE BOX
Reason for Admission: syncope	  History of Present Illness: 	  88 y/o F PMHx psoriasis, rheumatoid arthritis, GERD, depression, HTN, HLD presented to ED after fall last night. Patient states she was packing for a trip last night and fell. Patient denies tripping, denies feeling lightheaded/dizzy prior to fall. As per  at bedside, patient recently had a cold, took "[employer] brand Nyquil" for first time approximately 10 minutes prior to the fall. Patient states her memory has been a little "off" for the past few months. Patient currently denies any CP, SOB, palpitations, abd pain, N/V/D/C.    In ED, patient was found to be hypothermic (T- 94.9 F). Patient had CT head which was negative for any acute changes. Patient also had CT cervical spine which showed canal stenosis C5-C7. CXR done- prelim read- clear lungs. EKG done showing sinus bradycardia.    Examined at bed side today feels better but still foggy and with poor memory, denies sob, orthopnea, dizziness.   Tele, ecg, echo, labs, imaging reviewed personally.        Review of Systems:  · Negative General Symptoms	no fever; no chills	  · General Symptoms	weakness	  · Skin/Breast	negative	  · Ophthalmologic	negative	  · ENMT	negative	  · Negative Respiratory and Thorax Symptoms	no wheezing; no dyspnea; no pleuritic chest pain	  · Respiratory and Thorax Symptoms	cough	  · Negative Cardiovascular Symptoms	no chest pain; no palpitations; no dyspnea on exertion; no orthopnea; no peripheral edema	  · Gastrointestinal	negative	  · Genitourinary	negative	  · Musculoskeletal	negative	  · Negative Neurological Symptoms	no transient paralysis; no generalized seizures; no tremors; no headache	  · Neurological Symptoms	weakness; syncope; difficulty walking; loss of consciousness; confusion	  · Psychiatric	negative	  · Hematology/Lymphatics	negative	  · Endocrine	negative	      Allergies and Intolerances:        Allergies:  	No Known Allergies:        Intolerances:  	simvastatin: Drug, Rash    Home Medications:   * Patient Currently Takes Medications as of 16-Nov-2018 09:32 documented in Structured Notes  · 	aspirin 81 mg oral tablet: 1 tab(s) orally once a day, Last Dose Taken:    · 	multivitamin: 1 tab(s) orally once a day, Last Dose Taken:    · 	Fish Oil oral capsule: 1 tab(s) orally once a day, Last Dose Taken:    · 	Vitamin D3 1000 intl units oral capsule: 2 cap(s) orally once a day, Last Dose Taken:    · 	Calcium 600+D oral tablet: 1 tab(s) orally once a day, Last Dose Taken:    · 	Vitamin C 500 mg oral tablet: 2 tab(s) orally once a day, Last Dose Taken:    · 	magnesium carbonate 250 mg oral capsule: 500 milligram(s) orally once a day, Last Dose Taken:    · 	Lexapro 20 mg oral tablet: 1 tab(s) orally once a day, Last Dose Taken:    · 	pindolol 10 mg oral tablet: 1 tab(s) orally 2 times a day, Last Dose Taken:    · 	omeprazole 40 mg oral delayed release capsule: 1 cap(s) orally 2 times a day, Last Dose Taken:    · 	Stelara 45 mg/0.5 mL subcutaneous solution: subcutaneous every 3 months    .    Patient History:    Past Medical History:  Hyperlipidemia    Irregular heart rhythm    Psoriasis    Rheumatoid arthritis.     Past Surgical History:  History of bilateral breast reduction surgery.     Family History:  No pertinent family history in first degree relatives.     Social History:  Social History (marital status, living situation, occupation, tobacco use, alcohol and drug use, and sexual history): , lives at home with spouse, former smoker- quit 1985, drinks wine and some bourbon daily, denies drug use	     Tobacco Screening:  · Core Measure Site	Yes	  · Has the patient used tobacco in the past 30 days?	No	    Risk Assessment:    Present on Admission:  Deep Venous Thrombosis	no	  Pulmonary Embolus	no	     Heart Failure:  Does this patient have a history of or has been diagnosed with heart failure? no.       Physical Exam:  · Constitutional	detailed exam	  · Constitutional Details	well-developed; well-groomed; well-nourished; no distress	  · Eyes	detailed exam	  · Eyes Details	PERRL; EOMI; conjunctiva clear	  · ENMT	detailed exam	  · ENMT Details	nose; mouth	  · Nose	normal	  · Mouth	normal mouth and gums	  · Neck	detailed exam	  · Neck Details	supple; no JVD	  · Respiratory	detailed exam	  · Respiratory Details	breath sounds equal; good air movement; respirations non-labored; clear to auscultation bilaterally; no chest wall tenderness; no intercostal retractions; no rales; no rhonchi; no wheezes	  · Cardiovascular	detailed exam	  · Cardiovascular Details	regular rate and rhythm	  · Cardiovascular Details	positive S1; positive S2	  · Gastrointestinal	detailed exam	  · GI Normal	soft; nontender; no distention; no rebound tenderness; no guarding; no rigidity	  · Genitourinary	patient refused	  · Rectal	patient refused	  · Extremities	detailed exam	  · Extremities Details	no clubbing; no cyanosis; no pedal edema	  · Vascular	detailed exam	  · Radial Pulse	right normal; left normal	  · Neurological	detailed exam	  · Neurological Details	alert and oriented x 3; responds to verbal commands; sensation intact; cranial nerves intact; no spontaneous movement; normal strength	  · Skin	No lesions; no rash	  · Lymph Nodes	No lymphadedenopathy	  · Musculoskeletal	detailed exam	  · Musculoskeletal Details	ROM intact; no calf tenderness	      Assessment and Plan:    Assessment:  · Assessment		  88 y/o F PMHx psoriasis, rheumatoid arthritis, GERD, depression, HTN, HLD presented to ED after fall last night    #Syncope, doubt due to bradycardia since it has been mild bradycardia all along, would dc pindolol anyway. She is hypothyroid, which may have contributed to syncopal episode as well.  ECHO reviewed, preliminarily: Dilated RA and RV, with preserved RV systolic function and elevated RVSP at approximately 54 mmHg with no prior history of pulmonary artery hypertension, would probably try to exclude now pulmonary embolism as cause for her syncope and echo abnormalities. Tele with no sig larry so far. CE and ECG no ischemia.  Otherwise, would dc on Synthroid and follow up with Dr Garber in the office.       *Bradycardia, likely due to pindolol and hypothyroidism  *Hypothermia, as above too  * HTN, controlled. Keep off pindolol  * HLD, -Diet controlled

## 2018-11-17 NOTE — PROGRESS NOTE ADULT - SUBJECTIVE AND OBJECTIVE BOX
Patient is a 89y old  Female who presents with a chief complaint of syncope (17 Nov 2018 07:59)      HPI:  89 y/o F PMHx psoriasis, rheumatoid arthritis, GERD, depression, HTN, HLD presented to ED after fall last night. Patient states she was packing for a trip last night and fell. Patient denies tripping, denies feeling lightheaded/dizzy prior to fall. Her  reported that she took "[employer] brand Nyquil" about 10 minutes prior to the fall. Reportedly she was only unconscious for about one minute but was confused for several minutes later. She denies any previous history of syncope or seizures.   She was hypothermic in the ED and had a CT head which was negative for any acute changes.  She has no complaints at this time.   A CT chest performed this morning is significant for let upper lobe pneumonia and a T9 compression fracture of undetermined age.        PAST MEDICAL & SURGICAL HISTORY:  Irregular heart rhythm  Psoriasis  Hyperlipidemia  Rheumatoid arthritis  History of bilateral breast reduction surgery      FAMILY HISTORY:  No pertinent family history in first degree relatives      Social Hx:  Nonsmoker, no drug or alcohol use    MEDICATIONS  (STANDING):  aspirin enteric coated 81 milliGRAM(s) Oral daily  azithromycin  IVPB      azithromycin  IVPB 500 milliGRAM(s) IV Intermittent once  cefTRIAXone Injectable. 1000 milliGRAM(s) IV Push once  cefTRIAXone Injectable.      cholecalciferol 2000 Unit(s) Oral daily  enoxaparin Injectable 40 milliGRAM(s) SubCutaneous every 24 hours  escitalopram 20 milliGRAM(s) Oral daily  multivitamin 1 Tablet(s) Oral daily  pantoprazole    Tablet 40 milliGRAM(s) Oral two times a day  sodium phosphate IVPB 15 milliMole(s) IV Intermittent once       Allergies    No Known Allergies    Intolerances    simvastatin (Rash)      ROS: Pertinent positives in HPI, all other ROS were reviewed and are negative.      Vital Signs Last 24 Hrs  T(C): 37.1 (17 Nov 2018 05:21), Max: 37.1 (17 Nov 2018 05:21)  T(F): 98.7 (17 Nov 2018 05:21), Max: 98.7 (17 Nov 2018 05:21)  HR: 68 (17 Nov 2018 05:21) (62 - 76)  BP: 123/48 (17 Nov 2018 05:21) (123/48 - 130/63)  BP(mean): --  RR: 17 (16 Nov 2018 20:54) (17 - 18)  SpO2: 92% (17 Nov 2018 05:21) (92% - 98%)        Constitutional: awake and alert.  HEENT: PERRLA, EOMI,   Neck: Supple.  Respiratory: Breath sounds are clear bilaterally  Cardiovascular: S1 and S2, regular / irregular rhythm  Gastrointestinal: soft, nontender  Extremities:  no edema  Vascular: Carotid Bruit - no  Musculoskeletal: no joint swelling/tenderness, no abnormal movements  Skin: No rashes    Neurological exam:  HF: A x O x 3. Appropriately interactive, normal affect. Speech fluent, No Aphasia or paraphasic errors. Naming /repetition intact   CN: PERRL, EOMI, VFF, facial sensation normal, no NLFD, tongue midline, Palate moves equally, SCM equal bilaterally  Motor: No pronator drift, Strength 5/5 in all 4 ext, normal bulk and tone, no tremor, rigidity or bradykinesia.    Sens: Intact to light touch / PP/ VS/ JS    Reflexes: Symmetric and normal . BJ 1+, BR 1+, KJ 1+, AJ 1+, downgoing toes b/l  Coord:  No FNFA, dysmetria, BRISEIDA intact   Gait/Balance:  not tested    NIHSS:          Labs:   11-16    138  |  107  |  12  ----------------------------<  77  4.5   |  24  |  0.69    Ca    7.1<L>      16 Nov 2018 12:09  Phos  1.9     11-17  Mg     2.0     11-17    TPro  5.2<L>  /  Alb  2.7<L>  /  TBili  0.4  /  DBili  x   /  AST  22  /  ALT  17  /  AlkPhos  56  11-16 11-17 Chol 168 LDL 80 HDL 76 Trig 60                          12.1   4.91  )-----------( 160      ( 17 Nov 2018 04:39 )             37.2       Radiology:  CT head and cervical spine 11/16/18:   Head CT: No acute intracranial hemorrhage or calvarial fracture. Chronic   findings as above.    Cervical spine CT: No acute cervical spine fracture or evidence of   traumatic malalignment. Cervical spondylosis with severe canal stenosis   and C5/6 and C6/C7.      EEG 11/1/18:   This was a normal EEG study in the awake and drowsy states.    EEG Impression/Clinical Correlate:  No epileptiform activity was seen and no clinical events or seizures were   recorded. Consider a repeat study if clinically indicated.

## 2018-11-18 ENCOUNTER — TRANSCRIPTION ENCOUNTER (OUTPATIENT)
Age: 83
End: 2018-11-18

## 2018-11-18 VITALS
SYSTOLIC BLOOD PRESSURE: 141 MMHG | OXYGEN SATURATION: 97 % | RESPIRATION RATE: 18 BRPM | HEART RATE: 66 BPM | DIASTOLIC BLOOD PRESSURE: 78 MMHG | TEMPERATURE: 98 F

## 2018-11-18 LAB
ANION GAP SERPL CALC-SCNC: 6 MMOL/L — SIGNIFICANT CHANGE UP (ref 5–17)
BUN SERPL-MCNC: 6 MG/DL — LOW (ref 7–23)
CALCIUM SERPL-MCNC: 8.1 MG/DL — LOW (ref 8.5–10.1)
CHLORIDE SERPL-SCNC: 103 MMOL/L — SIGNIFICANT CHANGE UP (ref 96–108)
CO2 SERPL-SCNC: 27 MMOL/L — SIGNIFICANT CHANGE UP (ref 22–31)
CREAT SERPL-MCNC: 0.57 MG/DL — SIGNIFICANT CHANGE UP (ref 0.5–1.3)
GLUCOSE SERPL-MCNC: 86 MG/DL — SIGNIFICANT CHANGE UP (ref 70–99)
HCT VFR BLD CALC: 38.9 % — SIGNIFICANT CHANGE UP (ref 34.5–45)
HGB BLD-MCNC: 12.8 G/DL — SIGNIFICANT CHANGE UP (ref 11.5–15.5)
MAGNESIUM SERPL-MCNC: 2.2 MG/DL — SIGNIFICANT CHANGE UP (ref 1.6–2.6)
MCHC RBC-ENTMCNC: 29.8 PG — SIGNIFICANT CHANGE UP (ref 27–34)
MCHC RBC-ENTMCNC: 32.9 GM/DL — SIGNIFICANT CHANGE UP (ref 32–36)
MCV RBC AUTO: 90.7 FL — SIGNIFICANT CHANGE UP (ref 80–100)
NRBC # BLD: 0 /100 WBCS — SIGNIFICANT CHANGE UP (ref 0–0)
PHOSPHATE SERPL-MCNC: 2.9 MG/DL — SIGNIFICANT CHANGE UP (ref 2.5–4.5)
PLATELET # BLD AUTO: 151 K/UL — SIGNIFICANT CHANGE UP (ref 150–400)
POTASSIUM SERPL-MCNC: 3.8 MMOL/L — SIGNIFICANT CHANGE UP (ref 3.5–5.3)
POTASSIUM SERPL-SCNC: 3.8 MMOL/L — SIGNIFICANT CHANGE UP (ref 3.5–5.3)
RBC # BLD: 4.29 M/UL — SIGNIFICANT CHANGE UP (ref 3.8–5.2)
RBC # FLD: 13.8 % — SIGNIFICANT CHANGE UP (ref 10.3–14.5)
SODIUM SERPL-SCNC: 136 MMOL/L — SIGNIFICANT CHANGE UP (ref 135–145)
WBC # BLD: 3.82 K/UL — SIGNIFICANT CHANGE UP (ref 3.8–10.5)
WBC # FLD AUTO: 3.82 K/UL — SIGNIFICANT CHANGE UP (ref 3.8–10.5)

## 2018-11-18 RX ORDER — FLUTICASONE PROPIONATE 50 MCG
1 SPRAY, SUSPENSION NASAL
Qty: 1 | Refills: 0 | OUTPATIENT
Start: 2018-11-18 | End: 2018-11-24

## 2018-11-18 RX ORDER — CEFUROXIME AXETIL 250 MG
1 TABLET ORAL
Qty: 10 | Refills: 0 | OUTPATIENT
Start: 2018-11-18 | End: 2018-11-22

## 2018-11-18 RX ORDER — ONDANSETRON 8 MG/1
4 TABLET, FILM COATED ORAL EVERY 6 HOURS
Qty: 0 | Refills: 0 | Status: DISCONTINUED | OUTPATIENT
Start: 2018-11-18 | End: 2018-11-18

## 2018-11-18 RX ORDER — LACTOBACILLUS ACIDOPHILUS 100MM CELL
1 CAPSULE ORAL
Qty: 15 | Refills: 0 | OUTPATIENT
Start: 2018-11-18 | End: 2018-11-22

## 2018-11-18 RX ORDER — ONDANSETRON 8 MG/1
1 TABLET, FILM COATED ORAL
Qty: 9 | Refills: 0 | OUTPATIENT
Start: 2018-11-18 | End: 2018-11-20

## 2018-11-18 RX ORDER — LEVOTHYROXINE SODIUM 125 MCG
1 TABLET ORAL
Qty: 30 | Refills: 0 | OUTPATIENT
Start: 2018-11-18 | End: 2018-12-17

## 2018-11-18 RX ORDER — PINDOLOL 10 MG
1 TABLET ORAL
Qty: 0 | Refills: 0 | COMMUNITY

## 2018-11-18 RX ORDER — AZITHROMYCIN 500 MG/1
1 TABLET, FILM COATED ORAL
Qty: 3 | Refills: 0 | OUTPATIENT
Start: 2018-11-18 | End: 2018-11-20

## 2018-11-18 RX ADMIN — ESCITALOPRAM OXALATE 20 MILLIGRAM(S): 10 TABLET, FILM COATED ORAL at 13:33

## 2018-11-18 RX ADMIN — CEFTRIAXONE 1000 MILLIGRAM(S): 500 INJECTION, POWDER, FOR SOLUTION INTRAMUSCULAR; INTRAVENOUS at 13:33

## 2018-11-18 RX ADMIN — Medication 650 MILLIGRAM(S): at 08:03

## 2018-11-18 RX ADMIN — Medication 2000 UNIT(S): at 13:32

## 2018-11-18 RX ADMIN — Medication 81 MILLIGRAM(S): at 13:32

## 2018-11-18 RX ADMIN — Medication 650 MILLIGRAM(S): at 05:18

## 2018-11-18 RX ADMIN — ENOXAPARIN SODIUM 40 MILLIGRAM(S): 100 INJECTION SUBCUTANEOUS at 13:32

## 2018-11-18 RX ADMIN — ONDANSETRON 4 MILLIGRAM(S): 8 TABLET, FILM COATED ORAL at 15:03

## 2018-11-18 RX ADMIN — Medication 1 TABLET(S): at 13:32

## 2018-11-18 RX ADMIN — Medication 25 MICROGRAM(S): at 05:18

## 2018-11-18 RX ADMIN — Medication 1 SPRAY(S): at 05:18

## 2018-11-18 RX ADMIN — PANTOPRAZOLE SODIUM 40 MILLIGRAM(S): 20 TABLET, DELAYED RELEASE ORAL at 06:45

## 2018-11-18 RX ADMIN — AZITHROMYCIN 255 MILLIGRAM(S): 500 TABLET, FILM COATED ORAL at 13:32

## 2018-11-18 NOTE — DISCHARGE NOTE ADULT - HOSPITAL COURSE
89 y/o F PMHx psoriasis, rheumatoid arthritis, GERD, depression, HTN, HLD presented to ED after fall last night. Patient states she was packing for a trip last night and fell. Patient denies tripping, denies feeling lightheaded/dizzy prior to fall. As per  at bedside, patient recently had a cold, took "[employer] brand Nyquil" for first time approximately 10 minutes prior to the fall. Patient states her memory has been a little "off" for the past few months. Patient currently denies any CP, SOB, palpitations, abd pain, N/V/D/C.    In ED, patient was found to be hypothermic (T- 94.9 F). Patient had CT head which was negative for any acute changes. Patient also had CT cervical spine which showed canal stenosis C5-C7. CXR done- prelim read- clear lungs. EKG done showing sinus bradycardia.    11/17/18- Patient seen and examined at bedside. States she feels well, complains of nasal congestion, no other complaints  89 y/o F PMHx psoriasis, rheumatoid arthritis, GERD, depression, HTN, HLD presented to ED after fall last night. Patient states she was packing for a trip last night and fell. Patient denies tripping, denies feeling lightheaded/dizzy prior to fall. As per  at bedside, patient recently had a cold, took "[employer] brand Nyquil" for first time approximately 10 minutes prior to the fall. Patient states her memory has been a little "off" for the past few months. Patient currently denies any CP, SOB, palpitations, abd pain, N/V/D/C.    In ED, patient was found to be hypothermic (T- 94.9 F). Patient had CT head which was negative for any acute changes. Patient also had CT cervical spine which showed canal stenosis C5-C7. CXR done- prelim read- clear lungs. EKG done showing sinus bradycardia.    11/17/18- Patient seen and examined at bedside. States she feels well, complains of nasal congestion, no other complaints  11/18/18- Patient seen and examined at bedside. States she feels well, eager to go home    Physical Exam:  General: WN/WD NAD  Neurology: A&Ox3, nonfocal, CRUZ x 4  Respiratory: CTA B/L  CV: RRR, S1S2, no murmurs, rubs or gallops  Abdominal: Soft, NT, ND +BS  Extremities: No edema, + peripheral pulses    #Syncope, fall  #Bradycardia  #Hypothermia  #Pneumonia  -Check TTE- EF 65-70%  -Possible side effect of medicine?- recent nyquil use prior to fall  -Cardio consulted, appreciate recs  -Neuro consulted, appreciate recs- EEG done- normal study  -CXR prelim read clear lungs, UA negative, will defer antibiotics at this time- f/u blood and urine cx- neg  -CTA Chest done showing no PE, but positive for LARA PNA- likely CAP- started on abx- complete course as outpatient    #Elevated TSH  -Low T3 and T4  -Started on levothyroxine 25mcg daily  -Hypothyroidism may have contributed to syncope/bradycardia    #HTN  -SBP in 90s-100s in ED  -Observe off Pindolol (may be contributing to bradycardia)  -Appreciate cardio recs    #HLD  -Diet controlled    #Depression  -Continue lexapro    #GERD  -Continue protonix BID    Case discussed with Cardio  Total time spent on discharge: 36 minutes

## 2018-11-18 NOTE — DISCHARGE NOTE ADULT - CARE PROVIDER_API CALL
Scott Garber), Cardiovascular Disease; Internal Medicine  [address] [place] [address]  Phone: [phone]  Fax: [phone]    Ariela Mancilla), Clinical Neurophysiology; EEGEpilepsy; Neurology; Sleep Medicine  [address] [place] [address]  Phone: [phone]  Fax: [phone]

## 2018-11-18 NOTE — DISCHARGE NOTE ADULT - PATIENT PORTAL LINK FT
You can access the [facility] Patient Portal, offered by [facility], by registering with the following website: [email or web address]

## 2018-11-18 NOTE — PHYSICAL THERAPY INITIAL EVALUATION ADULT - PERTINENT HX OF CURRENT PROBLEM, REHAB EVAL
89 y/o F PMHx psoriasis, rheumatoid arthritis, GERD, depression, HTN, HLD presented to ED after fall last night. Patient states she was packing for a trip last night and fell. Patient denies tripping, denies feeling lightheaded/dizzy prior to fall. As per  at bedside, patient recently had a cold, took "[employer] brand Nyquil" for first time approximately 10 minutes prior to the fall.

## 2018-11-18 NOTE — DISCHARGE NOTE ADULT - CARE PLAN
Principal Discharge DX:	Syncope, unspecified syncope type  Goal:	no more syncopal episodes  Assessment and plan of treatment:	-Syncopal episode possibly 2/2 acute illness (pneumonia) vs. medication (nyquil)  -Follow up with Neurology within 2 weeks of discharge  Secondary Diagnosis:	Pneumonia  Assessment and plan of treatment:	-Complete course of antibiotics  -Follow up with PCP within 2 weeks of discharge  Secondary Diagnosis:	Hypothyroidism  Assessment and plan of treatment:	-Elevated TSH and low T3 and T4 levels while in hospital  -Started on levothyroxine 25mcg daily  -Please follow up with PCP within 2 weeks regarding repeat TSH, T3 and T4 levels

## 2018-11-18 NOTE — DISCHARGE NOTE ADULT - MEDICATION SUMMARY - MEDICATIONS TO STOP TAKING
I will STOP taking the medications listed below when I get home from the hospital:    pindolol 10 mg oral tablet  -- 1 tab(s) by mouth 2 times a day

## 2018-11-18 NOTE — DISCHARGE NOTE ADULT - MEDICATION SUMMARY - MEDICATIONS TO TAKE
I will START or STAY ON the medications listed below when I get home from the hospital:    aspirin 81 mg oral tablet  -- 1 tab(s) by mouth once a day  -- Indication: For Home med    Lexapro 20 mg oral tablet  -- 1 tab(s) by mouth once a day  -- Indication: For Home med    ondansetron 4 mg oral disintegrating strip  -- 1 film(s) by mouth 3 times a day, As Needed -for nausea   -- Indication: For nausea    cefuroxime 500 mg oral tablet  -- 1 tab(s) by mouth every 12 hours   -- Finish all this medication unless otherwise directed by prescriber.  Medication should be taken with plenty of water.  Take with food or milk.    -- Indication: For pneumonia    Stelara 45 mg/0.5 mL subcutaneous solution  -- subcutaneous every 3 months  -- Indication: For Home med    Zithromax 500 mg oral tablet  -- 1 tab(s) by mouth once a day   -- Indication: For pneumonia    magnesium carbonate 250 mg oral capsule  -- 500 milligram(s) by mouth once a day  -- Indication: For Home med    fluticasone 50 mcg/inh nasal spray  -- 1 spray(s) into nose 2 times a day  -- Indication: For nasal congestion    Fish Oil oral capsule  -- 1 tab(s) by mouth once a day  -- Indication: For Home med    lactobacillus acidophilus oral capsule  -- 1 cap(s) by mouth 3 times a day (with meals)   -- Indication: For prophylaxis    omeprazole 40 mg oral delayed release capsule  -- 1 cap(s) by mouth 2 times a day  -- Indication: For gerd    levothyroxine 25 mcg (0.025 mg) oral tablet  -- 1 tab(s) by mouth once a day  -- Indication: For Hypothyroidism    Calcium 600+D oral tablet  -- 1 tab(s) by mouth once a day  -- Indication: For Home med    multivitamin  -- 1 tab(s) by mouth once a day  -- Indication: For vitmain    Vitamin D3 1000 intl units oral capsule  -- 2 cap(s) by mouth once a day  -- Indication: For vitamin    Vitamin C 500 mg oral tablet  -- 2 tab(s) by mouth once a day  -- Indication: For vitamin

## 2018-11-18 NOTE — PROGRESS NOTE ADULT - SUBJECTIVE AND OBJECTIVE BOX
Reason for Admission: syncope	  History of Present Illness: 	  88 y/o F PMHx psoriasis, rheumatoid arthritis, GERD, depression, HTN, HLD presented to ED after fall last night. Patient states she was packing for a trip last night and fell. Patient denies tripping, denies feeling lightheaded/dizzy prior to fall. As per  at bedside, patient recently had a cold, took "[employer] brand Nyquil" for first time approximately 10 minutes prior to the fall. Patient states her memory has been a little "off" for the past few months. Patient currently denies any CP, SOB, palpitations, abd pain, N/V/D/C.    In ED, patient was found to be hypothermic (T- 94.9 F). Patient had CT head which was negative for any acute changes. Patient also had CT cervical spine which showed canal stenosis C5-C7. CXR done- prelim read- clear lungs. EKG done showing sinus bradycardia.    Examined at bed side today feels better but still foggy and with poor memory, denies sob, orthopnea, dizziness.   Tele, ecg, echo, labs, imaging reviewed personally.     11/18 - feel better, CTPA negative for PE, but has mild pneumonia, being treated now for it. Can go home today and follow up with Dr Garber in the office.        Review of Systems:  · Negative General Symptoms	no fever; no chills	  · General Symptoms	weakness	  · Skin/Breast	negative	  · Ophthalmologic	negative	  · ENMT	negative	  · Negative Respiratory and Thorax Symptoms	no wheezing; no dyspnea; no pleuritic chest pain	  · Respiratory and Thorax Symptoms	cough	  · Negative Cardiovascular Symptoms	no chest pain; no palpitations; no dyspnea on exertion; no orthopnea; no peripheral edema	  · Gastrointestinal	negative	  · Genitourinary	negative	  · Musculoskeletal	negative	  · Negative Neurological Symptoms	no transient paralysis; no generalized seizures; no tremors; no headache	  · Neurological Symptoms	weakness; syncope; difficulty walking; loss of consciousness; confusion	  · Psychiatric	negative	  · Hematology/Lymphatics	negative	  · Endocrine	negative	      Allergies and Intolerances:        Allergies:  	No Known Allergies:        Intolerances:  	simvastatin: Drug, Rash    Home Medications:   * Patient Currently Takes Medications as of 16-Nov-2018 09:32 documented in Structured Notes  · 	aspirin 81 mg oral tablet: 1 tab(s) orally once a day, Last Dose Taken:    · 	multivitamin: 1 tab(s) orally once a day, Last Dose Taken:    · 	Fish Oil oral capsule: 1 tab(s) orally once a day, Last Dose Taken:    · 	Vitamin D3 1000 intl units oral capsule: 2 cap(s) orally once a day, Last Dose Taken:    · 	Calcium 600+D oral tablet: 1 tab(s) orally once a day, Last Dose Taken:    · 	Vitamin C 500 mg oral tablet: 2 tab(s) orally once a day, Last Dose Taken:    · 	magnesium carbonate 250 mg oral capsule: 500 milligram(s) orally once a day, Last Dose Taken:    · 	Lexapro 20 mg oral tablet: 1 tab(s) orally once a day, Last Dose Taken:    · 	pindolol 10 mg oral tablet: 1 tab(s) orally 2 times a day, Last Dose Taken:    · 	omeprazole 40 mg oral delayed release capsule: 1 cap(s) orally 2 times a day, Last Dose Taken:    · 	Stelara 45 mg/0.5 mL subcutaneous solution: subcutaneous every 3 months    .    Patient History:    Past Medical History:  Hyperlipidemia    Irregular heart rhythm    Psoriasis    Rheumatoid arthritis.     Past Surgical History:  History of bilateral breast reduction surgery.     Family History:  No pertinent family history in first degree relatives.     Social History:  Social History (marital status, living situation, occupation, tobacco use, alcohol and drug use, and sexual history): , lives at home with spouse, former smoker- quit 1985, drinks wine and some bourbon daily, denies drug use	     Tobacco Screening:  · Core Measure Site	Yes	  · Has the patient used tobacco in the past 30 days?	No	    Risk Assessment:    Present on Admission:  Deep Venous Thrombosis	no	  Pulmonary Embolus	no	     Heart Failure:  Does this patient have a history of or has been diagnosed with heart failure? no.       Physical Exam:  · Constitutional	detailed exam	  · Constitutional Details	well-developed; well-groomed; well-nourished; no distress	  · Eyes	detailed exam	  · Eyes Details	PERRL; EOMI; conjunctiva clear	  · ENMT	detailed exam	  · ENMT Details	nose; mouth	  · Nose	normal	  · Mouth	normal mouth and gums	  · Neck	detailed exam	  · Neck Details	supple; no JVD	  · Respiratory	detailed exam	  · Respiratory Details	breath sounds equal; good air movement; respirations non-labored; clear to auscultation bilaterally; no chest wall tenderness; no intercostal retractions; no rales; no rhonchi; no wheezes	  · Cardiovascular	detailed exam	  · Cardiovascular Details	regular rate and rhythm	  · Cardiovascular Details	positive S1; positive S2	  · Gastrointestinal	detailed exam	  · GI Normal	soft; nontender; no distention; no rebound tenderness; no guarding; no rigidity	  · Genitourinary	patient refused	  · Rectal	patient refused	  · Extremities	detailed exam	  · Extremities Details	no clubbing; no cyanosis; no pedal edema	  · Vascular	detailed exam	  · Radial Pulse	right normal; left normal	  · Neurological	detailed exam	  · Neurological Details	alert and oriented x 3; responds to verbal commands; sensation intact; cranial nerves intact; no spontaneous movement; normal strength	  · Skin	No lesions; no rash	  · Lymph Nodes	No lymphadedenopathy	  · Musculoskeletal	detailed exam	  · Musculoskeletal Details	ROM intact; no calf tenderness	      Assessment and Plan:    Assessment:  · Assessment		  88 y/o F PMHx psoriasis, rheumatoid arthritis, GERD, depression, HTN, HLD presented to ED after fall last night    #Syncope, doubt due to bradycardia since it has been mild bradycardia all along, would dc pindolol anyway. She is hypothyroid, which may have contributed to syncopal episode as well.  ECHO reviewed, preliminarily: Dilated RA and RV, with preserved RV systolic function and elevated RVSP at approximately 54 mmHg with no prior history of pulmonary artery hypertension, would probably try to exclude now pulmonary embolism as cause for her syncope and echo abnormalities. Tele with no sig larry so far. CE and ECG no ischemia.  Otherwise, would dc on Synthroid and follow up with Dr Garber in the office.       *Bradycardia, likely due to pindolol and hypothyroidism  *Hypothermia, as above too  * HTN, controlled. Keep off pindolol  * HLD, -Diet controlled

## 2018-11-21 LAB
CULTURE RESULTS: SIGNIFICANT CHANGE UP
CULTURE RESULTS: SIGNIFICANT CHANGE UP
SPECIMEN SOURCE: SIGNIFICANT CHANGE UP
SPECIMEN SOURCE: SIGNIFICANT CHANGE UP

## 2018-11-23 DIAGNOSIS — K21.9 GASTRO-ESOPHAGEAL REFLUX DISEASE WITHOUT ESOPHAGITIS: ICD-10-CM

## 2018-11-23 DIAGNOSIS — L40.9 PSORIASIS, UNSPECIFIED: ICD-10-CM

## 2018-11-23 DIAGNOSIS — R00.1 BRADYCARDIA, UNSPECIFIED: ICD-10-CM

## 2018-11-23 DIAGNOSIS — T44.7X5A ADVERSE EFFECT OF BETA-ADRENORECEPTOR ANTAGONISTS, INITIAL ENCOUNTER: ICD-10-CM

## 2018-11-23 DIAGNOSIS — E03.9 HYPOTHYROIDISM, UNSPECIFIED: ICD-10-CM

## 2018-11-23 DIAGNOSIS — R68.0 HYPOTHERMIA, NOT ASSOCIATED WITH LOW ENVIRONMENTAL TEMPERATURE: ICD-10-CM

## 2018-11-23 DIAGNOSIS — J18.9 PNEUMONIA, UNSPECIFIED ORGANISM: ICD-10-CM

## 2018-11-23 DIAGNOSIS — F32.9 MAJOR DEPRESSIVE DISORDER, SINGLE EPISODE, UNSPECIFIED: ICD-10-CM

## 2018-11-23 DIAGNOSIS — M06.9 RHEUMATOID ARTHRITIS, UNSPECIFIED: ICD-10-CM

## 2018-11-23 DIAGNOSIS — R55 SYNCOPE AND COLLAPSE: ICD-10-CM

## 2018-11-23 DIAGNOSIS — I10 ESSENTIAL (PRIMARY) HYPERTENSION: ICD-10-CM

## 2018-11-23 DIAGNOSIS — E78.5 HYPERLIPIDEMIA, UNSPECIFIED: ICD-10-CM

## 2019-01-07 RX ORDER — MULTIVITAMIN
TABLET ORAL DAILY
Refills: 0 | Status: ACTIVE | COMMUNITY
Start: 2018-11-26

## 2019-01-07 RX ORDER — ASPIRIN 81 MG
600-200 TABLET, DELAYED RELEASE (ENTERIC COATED) ORAL DAILY
Refills: 0 | Status: ACTIVE | COMMUNITY
Start: 2018-11-26

## 2019-01-07 RX ORDER — FLUTICASONE PROPIONATE 50 UG/1
50 SPRAY, METERED NASAL DAILY
Refills: 0 | Status: ACTIVE | COMMUNITY
Start: 2018-11-26

## 2019-01-07 RX ORDER — MULTIVIT-MIN/IRON/FOLIC ACID/K 18-600-40
500 CAPSULE ORAL DAILY
Refills: 0 | Status: ACTIVE | COMMUNITY
Start: 2018-11-26

## 2019-01-07 RX ORDER — ASPIRIN 81 MG/1
81 TABLET ORAL DAILY
Refills: 0 | Status: ACTIVE | COMMUNITY
Start: 2018-11-26

## 2019-01-07 RX ORDER — USTEKINUMAB 90 MG/ML
90 INJECTION, SOLUTION SUBCUTANEOUS
Refills: 0 | Status: ACTIVE | COMMUNITY

## 2019-01-07 RX ORDER — MELATONIN 3 MG
25 MCG TABLET ORAL DAILY
Refills: 0 | Status: ACTIVE | COMMUNITY
Start: 2018-11-26

## 2019-01-07 RX ORDER — LEVOTHYROXINE SODIUM 0.03 MG/1
25 TABLET ORAL DAILY
Refills: 0 | Status: ACTIVE | COMMUNITY
Start: 2018-11-26

## 2019-06-24 ENCOUNTER — APPOINTMENT (OUTPATIENT)
Dept: RHEUMATOLOGY | Facility: CLINIC | Age: 84
End: 2019-06-24
Payer: MEDICARE

## 2019-06-24 VITALS
OXYGEN SATURATION: 98 % | WEIGHT: 114 LBS | SYSTOLIC BLOOD PRESSURE: 116 MMHG | DIASTOLIC BLOOD PRESSURE: 74 MMHG | HEIGHT: 59 IN | HEART RATE: 62 BPM | BODY MASS INDEX: 22.98 KG/M2

## 2019-06-24 PROCEDURE — 99213 OFFICE O/P EST LOW 20 MIN: CPT

## 2019-06-24 RX ORDER — ONDANSETRON HYDROCHLORIDE 4 MG/1
4 TABLET, FILM COATED ORAL
Qty: 9 | Refills: 0 | Status: DISCONTINUED | COMMUNITY
Start: 2018-11-26 | End: 2019-06-24

## 2019-06-24 RX ORDER — OMEGA-3/DHA/EPA/FISH OIL 300-1000MG
CAPSULE ORAL EVERY 8 HOURS
Refills: 0 | Status: DISCONTINUED | COMMUNITY
Start: 2018-11-26 | End: 2019-06-24

## 2019-06-24 NOTE — REVIEW OF SYSTEMS
[Fever] : no fever [Chills] : no chills [Feeling Poorly] : not feeling poorly [Dry Eyes] : no dryness of the eyes [Feeling Tired] : not feeling tired [Loss Of Hearing] : no hearing loss [Chest Pain] : no chest pain [Palpitations] : no palpitations [SOB on Exertion] : no shortness of breath during exertion [Cough] : no cough [Abdominal Pain] : no abdominal pain [Arthralgias] : arthralgias [Joint Pain] : joint pain [Joint Swelling] : no joint swelling [Joint Stiffness] : no joint stiffness [Skin Lesions] : skin lesion [Difficulty Walking] : no difficulty walking [Limb Weakness] : no limb weakness [Anxiety] : no anxiety [Muscle Weakness] : no muscle weakness [Depression] : no depression [de-identified] : + ? psoriasis  [Easy Bruising] : no tendency for easy bruising

## 2019-06-24 NOTE — HISTORY OF PRESENT ILLNESS
[FreeTextEntry1] : 89 year old female with a history of long standing RA and GERD. \par she has been diagnosed with extensive psoriasis. She is now on stelara. She is doing very well with it and states that her skin has cleared up. \par \par She denies joint pains of prolonged morning stiffness. She rates her current joint pain at a 2/10. She denies any fatigue. She does not miss using the rituxan.  She denies any recent infections fevers chills or night sweats. She has a good appetite. Her only other issue besides the psoriasis is being forgetful.\par She denies any other issues at this time. \par \par She has been feeling well and working in the yard.

## 2019-06-24 NOTE — PHYSICAL EXAM
[General Appearance - Well Nourished] : well nourished [General Appearance - Well Developed] : well developed [Sclera] : the sclera and conjunctiva were normal [Oropharynx] : the oropharynx was normal [Neck Appearance] : the appearance of the neck was normal [Respiration, Rhythm And Depth] : normal respiratory rhythm and effort [Thyroid Diffuse Enlargement] : the thyroid was not enlarged [Auscultation Breath Sounds / Voice Sounds] : lungs were clear to auscultation bilaterally [Heart Sounds] : normal S1 and S2 [Edema] : there was no peripheral edema [Full Pulse] : the pedal pulses are present [Abdomen Tenderness] : non-tender [Cervical Lymph Nodes Enlarged Anterior Bilaterally] : anterior cervical [No Spinal Tenderness] : no spinal tenderness [] : no rash [FreeTextEntry1] : skin improved [Motor Exam] : the motor exam was normal [Deep Tendon Reflexes (DTR)] : deep tendon reflexes were 2+ and symmetric [Oriented To Time, Place, And Person] : oriented to person, place, and time [Impaired Insight] : insight and judgment were intact [Affect] : the affect was normal

## 2019-06-24 NOTE — ASSESSMENT
[FreeTextEntry1] : 89  year old female with long standing RA and spinal stenosis. \par \par \par RA- she was on rituxan monotherapy. She has done well with it. now on hold  after she started stelara. currently there is no indication additional meds, her RA is quiescent. \par \par Polyarthralgia- to use tylenol prn. She has GERD and is not a candidate for additional meds. \par \par Psoriasis- new onset of psoriasis, she is now on stelara, her psoriasis has improved. She is to continue with med for now as per dermatology.\par \par She is aware to call if her sx worsen. she is aware to call if febrile or unwell. \par \par I will be seeing her again in 6 months. \par I will obtain copy of her recent labs

## 2019-12-11 NOTE — ED PROVIDER NOTE - CPE EDP ENMT NORM
Quality 131: Pain Assessment And Follow-Up: Pain assessment using a standardized tool is documented as negative, no follow-up plan required Quality 431: Preventive Care And Screening: Unhealthy Alcohol Use - Screening: Patient screened for unhealthy alcohol use using a single question and scores less than 2 times per year Quality 226: Preventive Care And Screening: Tobacco Use: Screening And Cessation Intervention: Patient screened for tobacco use and is an ex/non-smoker Quality 111:Pneumonia Vaccination Status For Older Adults: Pneumococcal Vaccination Previously Received Quality 130: Documentation Of Current Medications In The Medical Record: Current Medications Documented Quality 110: Preventive Care And Screening: Influenza Immunization: Influenza Immunization previously received during influenza season Detail Level: Generalized normal...

## 2019-12-16 ENCOUNTER — APPOINTMENT (OUTPATIENT)
Dept: RHEUMATOLOGY | Facility: CLINIC | Age: 84
End: 2019-12-16
Payer: MEDICARE

## 2019-12-16 VITALS
WEIGHT: 113 LBS | DIASTOLIC BLOOD PRESSURE: 70 MMHG | TEMPERATURE: 97.6 F | OXYGEN SATURATION: 98 % | SYSTOLIC BLOOD PRESSURE: 128 MMHG | HEART RATE: 74 BPM | BODY MASS INDEX: 22.82 KG/M2

## 2019-12-16 DIAGNOSIS — M05.20 RHEUMATOID VASCULITIS WITH RHEUMATOID ARTHRITIS OF UNSPECIFIED SITE: ICD-10-CM

## 2019-12-16 PROCEDURE — 99213 OFFICE O/P EST LOW 20 MIN: CPT

## 2019-12-16 NOTE — HISTORY OF PRESENT ILLNESS
[FreeTextEntry1] : 89 year old female with a history of long standing RA and GERD. \par she has been diagnosed with extensive psoriasis. She is now on stelara. She is doing very well with it and states that her skin has cleared up. \par \par She denies joint pains of prolonged morning stiffness. She rates her current joint pain at a 2/10. She denies any fatigue. She does not miss using the rituxan.  She denies any recent infections fevers chills or night sweats. She has a good appetite. Her only other issue besides the psoriasis is being forgetful.\par She denies any other issues at this time. \par \par She has been feeling well and busy with her house

## 2019-12-16 NOTE — ASSESSMENT
[FreeTextEntry1] : 89  year old female with long standing RA and spinal stenosis. \par \par \par RA- she was on rituxan monotherapy. She has done well with it. now on hold  after she started stelara. currently there is no indication additional meds, her RA is quiescent. \par \par Polyarthralgia- to use tylenol prn. She has GERD and is not a candidate for additional meds. \par \par Psoriasis- new onset of psoriasis, she is now on stelara, her psoriasis has improved. She is to continue with med for now as per dermatology.\par \par She is aware to call if her sx worsen. she is aware to call if febrile or unwell. \par \par I will be seeing her again in 6 months. \par I will obtain copy of her recent labs

## 2019-12-16 NOTE — PHYSICAL EXAM
[General Appearance - Well Nourished] : well nourished [General Appearance - Well Developed] : well developed [Sclera] : the sclera and conjunctiva were normal [Oropharynx] : the oropharynx was normal [Thyroid Diffuse Enlargement] : the thyroid was not enlarged [Neck Appearance] : the appearance of the neck was normal [Respiration, Rhythm And Depth] : normal respiratory rhythm and effort [Heart Sounds] : normal S1 and S2 [Auscultation Breath Sounds / Voice Sounds] : lungs were clear to auscultation bilaterally [Edema] : there was no peripheral edema [Full Pulse] : the pedal pulses are present [Abdomen Tenderness] : non-tender [Cervical Lymph Nodes Enlarged Anterior Bilaterally] : anterior cervical [No Spinal Tenderness] : no spinal tenderness [] : no rash [Deep Tendon Reflexes (DTR)] : deep tendon reflexes were 2+ and symmetric [FreeTextEntry1] : skin improved [Motor Exam] : the motor exam was normal [Oriented To Time, Place, And Person] : oriented to person, place, and time [Impaired Insight] : insight and judgment were intact [Affect] : the affect was normal

## 2019-12-16 NOTE — REVIEW OF SYSTEMS
[Fever] : no fever [Chills] : no chills [Dry Eyes] : no dryness of the eyes [Feeling Tired] : not feeling tired [Feeling Poorly] : not feeling poorly [Loss Of Hearing] : no hearing loss [Chest Pain] : no chest pain [Palpitations] : no palpitations [Cough] : no cough [SOB on Exertion] : no shortness of breath during exertion [Abdominal Pain] : no abdominal pain [Arthralgias] : arthralgias [Joint Pain] : joint pain [Joint Stiffness] : no joint stiffness [Joint Swelling] : no joint swelling [Skin Lesions] : skin lesion [Limb Weakness] : no limb weakness [Difficulty Walking] : no difficulty walking [Anxiety] : no anxiety [Depression] : no depression [Muscle Weakness] : no muscle weakness [de-identified] : + ? psoriasis  [Easy Bruising] : no tendency for easy bruising

## 2020-05-17 NOTE — ED ADULT TRIAGE NOTE - CHIEF COMPLAINT QUOTE
pt presents to ED bIBA due to fall at home found by  in the bathroom confused pt has had a cold x 3 days took nyquil prior to bed tonight TA called no code stroke I have personally seen and examined this patient.  I have fully participated in the care of this patient. I have reviewed all pertinent clinical information, including history, physical exam, plan and the Resident’s note and agree except as noted.

## 2020-07-31 ENCOUNTER — APPOINTMENT (OUTPATIENT)
Dept: RHEUMATOLOGY | Facility: CLINIC | Age: 85
End: 2020-07-31
Payer: MEDICARE

## 2020-07-31 VITALS
SYSTOLIC BLOOD PRESSURE: 120 MMHG | DIASTOLIC BLOOD PRESSURE: 80 MMHG | BODY MASS INDEX: 22.98 KG/M2 | HEART RATE: 67 BPM | TEMPERATURE: 97.3 F | HEIGHT: 59 IN | OXYGEN SATURATION: 94 % | WEIGHT: 114 LBS

## 2020-07-31 DIAGNOSIS — L40.9 PSORIASIS, UNSPECIFIED: ICD-10-CM

## 2020-07-31 DIAGNOSIS — M05.742 RHEUMATOID ARTHRITIS WITH RHEUMATOID FACTOR OF RIGHT HAND W/OUT ORGAN OR SYSTEMS INVOLVEMENT: ICD-10-CM

## 2020-07-31 DIAGNOSIS — M05.741 RHEUMATOID ARTHRITIS WITH RHEUMATOID FACTOR OF RIGHT HAND W/OUT ORGAN OR SYSTEMS INVOLVEMENT: ICD-10-CM

## 2020-07-31 DIAGNOSIS — M25.50 PAIN IN UNSPECIFIED JOINT: ICD-10-CM

## 2020-07-31 PROCEDURE — 99213 OFFICE O/P EST LOW 20 MIN: CPT

## 2020-07-31 NOTE — REVIEW OF SYSTEMS
[Arthralgias] : arthralgias [Joint Pain] : joint pain [Skin Lesions] : skin lesion [Fever] : no fever [Chills] : no chills [Feeling Poorly] : not feeling poorly [Feeling Tired] : not feeling tired [Dry Eyes] : no dryness of the eyes [Loss Of Hearing] : no hearing loss [Chest Pain] : no chest pain [Palpitations] : no palpitations [Cough] : no cough [Abdominal Pain] : no abdominal pain [SOB on Exertion] : no shortness of breath during exertion [Joint Swelling] : no joint swelling [Joint Stiffness] : no joint stiffness [Limb Weakness] : no limb weakness [Difficulty Walking] : no difficulty walking [Depression] : no depression [Anxiety] : no anxiety [Muscle Weakness] : no muscle weakness [Easy Bruising] : no tendency for easy bruising [de-identified] : + ? psoriasis

## 2020-07-31 NOTE — ASSESSMENT
[FreeTextEntry1] : 89  year old female with long standing RA and spinal stenosis. \par \par \par RA- she was on rituxan monotherapy. She has done well with it. now on hold  after she started stelara. currently there is no indication additional meds, her RA is quiescent. \par \par Polyarthralgia- to use tylenol prn. She has GERD and is not a candidate for additional meds. \par \par Psoriasis- new onset of psoriasis, she is now on stelara, her psoriasis has improved. She is to continue with med for now as per dermatology.\par \par She is aware to call if her sx worsen. she is aware to call if febrile or unwell. \par \par I will be seeing her again in 1 year\par I will obtain copy of her recent labs

## 2020-07-31 NOTE — HISTORY OF PRESENT ILLNESS
[FreeTextEntry1] : 89 year old female with a history of long standing RA and GERD. \par she has been diagnosed with extensive psoriasis. She is now on stelara. She is doing very well with it and states that her skin has cleared up. \par \par She denies joint pains of prolonged morning stiffness. She rates her current joint pain at a 2/10. She denies any fatigue. She does not miss using the rituxan.  She denies any recent infections fevers chills or night sweats. She has a good appetite. Her only other issue besides the psoriasis is being forgetful.\par She denies any other issues at this time. \par \par She has been feeling well and busy with her house and yard

## 2020-07-31 NOTE — PHYSICAL EXAM
[General Appearance - Well Nourished] : well nourished [General Appearance - Well Developed] : well developed [Sclera] : the sclera and conjunctiva were normal [Oropharynx] : the oropharynx was normal [Neck Appearance] : the appearance of the neck was normal [Thyroid Diffuse Enlargement] : the thyroid was not enlarged [Auscultation Breath Sounds / Voice Sounds] : lungs were clear to auscultation bilaterally [Respiration, Rhythm And Depth] : normal respiratory rhythm and effort [Heart Sounds] : normal S1 and S2 [Full Pulse] : the pedal pulses are present [Edema] : there was no peripheral edema [Abdomen Tenderness] : non-tender [Cervical Lymph Nodes Enlarged Anterior Bilaterally] : anterior cervical [No Spinal Tenderness] : no spinal tenderness [] : no rash [Motor Exam] : the motor exam was normal [Deep Tendon Reflexes (DTR)] : deep tendon reflexes were 2+ and symmetric [Affect] : the affect was normal [Oriented To Time, Place, And Person] : oriented to person, place, and time [Impaired Insight] : insight and judgment were intact [FreeTextEntry1] : skin improved

## 2021-07-30 ENCOUNTER — APPOINTMENT (OUTPATIENT)
Dept: RHEUMATOLOGY | Facility: CLINIC | Age: 86
End: 2021-07-30

## 2021-08-14 ENCOUNTER — EMERGENCY (EMERGENCY)
Facility: HOSPITAL | Age: 86
LOS: 0 days | Discharge: HOME | End: 2021-08-14
Attending: EMERGENCY MEDICINE
Payer: MEDICARE

## 2021-08-14 ENCOUNTER — TRANSCRIPTION ENCOUNTER (OUTPATIENT)
Age: 86
End: 2021-08-14

## 2021-08-14 VITALS
TEMPERATURE: 97 F | DIASTOLIC BLOOD PRESSURE: 83 MMHG | HEART RATE: 92 BPM | RESPIRATION RATE: 18 BRPM | OXYGEN SATURATION: 97 % | SYSTOLIC BLOOD PRESSURE: 155 MMHG

## 2021-08-14 VITALS — WEIGHT: 110.01 LBS

## 2021-08-14 DIAGNOSIS — M06.9 RHEUMATOID ARTHRITIS, UNSPECIFIED: ICD-10-CM

## 2021-08-14 DIAGNOSIS — L03.115 CELLULITIS OF RIGHT LOWER LIMB: ICD-10-CM

## 2021-08-14 DIAGNOSIS — Z20.822 CONTACT WITH AND (SUSPECTED) EXPOSURE TO COVID-19: ICD-10-CM

## 2021-08-14 DIAGNOSIS — E78.5 HYPERLIPIDEMIA, UNSPECIFIED: ICD-10-CM

## 2021-08-14 DIAGNOSIS — L40.9 PSORIASIS, UNSPECIFIED: ICD-10-CM

## 2021-08-14 DIAGNOSIS — Z90.13 ACQUIRED ABSENCE OF BILATERAL BREASTS AND NIPPLES: Chronic | ICD-10-CM

## 2021-08-14 LAB
ALBUMIN SERPL ELPH-MCNC: 3.4 G/DL — SIGNIFICANT CHANGE UP (ref 3.3–5)
ALP SERPL-CCNC: 192 U/L — HIGH (ref 40–120)
ALT FLD-CCNC: 39 U/L — SIGNIFICANT CHANGE UP (ref 12–78)
ANION GAP SERPL CALC-SCNC: 7 MMOL/L — SIGNIFICANT CHANGE UP (ref 5–17)
APTT BLD: 33.2 SEC — SIGNIFICANT CHANGE UP (ref 27.5–35.5)
AST SERPL-CCNC: 36 U/L — SIGNIFICANT CHANGE UP (ref 15–37)
BASOPHILS # BLD AUTO: 0.17 K/UL — SIGNIFICANT CHANGE UP (ref 0–0.2)
BASOPHILS NFR BLD AUTO: 2 % — SIGNIFICANT CHANGE UP (ref 0–2)
BILIRUB SERPL-MCNC: 0.5 MG/DL — SIGNIFICANT CHANGE UP (ref 0.2–1.2)
BUN SERPL-MCNC: 14 MG/DL — SIGNIFICANT CHANGE UP (ref 7–23)
CALCIUM SERPL-MCNC: 8.9 MG/DL — SIGNIFICANT CHANGE UP (ref 8.5–10.1)
CHLORIDE SERPL-SCNC: 102 MMOL/L — SIGNIFICANT CHANGE UP (ref 96–108)
CO2 SERPL-SCNC: 26 MMOL/L — SIGNIFICANT CHANGE UP (ref 22–31)
CREAT SERPL-MCNC: 0.75 MG/DL — SIGNIFICANT CHANGE UP (ref 0.5–1.3)
EOSINOPHIL # BLD AUTO: 0.09 K/UL — SIGNIFICANT CHANGE UP (ref 0–0.5)
EOSINOPHIL NFR BLD AUTO: 1 % — SIGNIFICANT CHANGE UP (ref 0–6)
ERYTHROCYTE [SEDIMENTATION RATE] IN BLOOD: 44 MM/HR — HIGH (ref 0–20)
GLUCOSE SERPL-MCNC: 91 MG/DL — SIGNIFICANT CHANGE UP (ref 70–99)
HCT VFR BLD CALC: 39.8 % — SIGNIFICANT CHANGE UP (ref 34.5–45)
HGB BLD-MCNC: 13.2 G/DL — SIGNIFICANT CHANGE UP (ref 11.5–15.5)
INR BLD: 1.22 RATIO — HIGH (ref 0.88–1.16)
LACTATE SERPL-SCNC: 1.1 MMOL/L — SIGNIFICANT CHANGE UP (ref 0.7–2)
LYMPHOCYTES # BLD AUTO: 1.37 K/UL — SIGNIFICANT CHANGE UP (ref 1–3.3)
LYMPHOCYTES # BLD AUTO: 16 % — SIGNIFICANT CHANGE UP (ref 13–44)
MCHC RBC-ENTMCNC: 29.2 PG — SIGNIFICANT CHANGE UP (ref 27–34)
MCHC RBC-ENTMCNC: 33.2 GM/DL — SIGNIFICANT CHANGE UP (ref 32–36)
MCV RBC AUTO: 88.1 FL — SIGNIFICANT CHANGE UP (ref 80–100)
MONOCYTES # BLD AUTO: 0.86 K/UL — SIGNIFICANT CHANGE UP (ref 0–0.9)
MONOCYTES NFR BLD AUTO: 10 % — SIGNIFICANT CHANGE UP (ref 2–14)
NEUTROPHILS # BLD AUTO: 5.24 K/UL — SIGNIFICANT CHANGE UP (ref 1.8–7.4)
NEUTROPHILS NFR BLD AUTO: 60 % — SIGNIFICANT CHANGE UP (ref 43–77)
NRBC # BLD: SIGNIFICANT CHANGE UP /100 WBCS (ref 0–0)
PLATELET # BLD AUTO: 417 K/UL — HIGH (ref 150–400)
POTASSIUM SERPL-MCNC: 4.4 MMOL/L — SIGNIFICANT CHANGE UP (ref 3.5–5.3)
POTASSIUM SERPL-SCNC: 4.4 MMOL/L — SIGNIFICANT CHANGE UP (ref 3.5–5.3)
PROT SERPL-MCNC: 7 GM/DL — SIGNIFICANT CHANGE UP (ref 6–8.3)
PROTHROM AB SERPL-ACNC: 14.1 SEC — HIGH (ref 10.6–13.6)
RBC # BLD: 4.52 M/UL — SIGNIFICANT CHANGE UP (ref 3.8–5.2)
RBC # FLD: 13.8 % — SIGNIFICANT CHANGE UP (ref 10.3–14.5)
SARS-COV-2 RNA SPEC QL NAA+PROBE: SIGNIFICANT CHANGE UP
SODIUM SERPL-SCNC: 135 MMOL/L — SIGNIFICANT CHANGE UP (ref 135–145)
WBC # BLD: 8.59 K/UL — SIGNIFICANT CHANGE UP (ref 3.8–10.5)
WBC # FLD AUTO: 8.59 K/UL — SIGNIFICANT CHANGE UP (ref 3.8–10.5)

## 2021-08-14 PROCEDURE — 93971 EXTREMITY STUDY: CPT | Mod: 26,RT

## 2021-08-14 PROCEDURE — 99284 EMERGENCY DEPT VISIT MOD MDM: CPT | Mod: 25

## 2021-08-14 PROCEDURE — 93005 ELECTROCARDIOGRAM TRACING: CPT

## 2021-08-14 PROCEDURE — 85025 COMPLETE CBC W/AUTO DIFF WBC: CPT

## 2021-08-14 PROCEDURE — 85730 THROMBOPLASTIN TIME PARTIAL: CPT

## 2021-08-14 PROCEDURE — 85610 PROTHROMBIN TIME: CPT

## 2021-08-14 PROCEDURE — 93971 EXTREMITY STUDY: CPT | Mod: RT

## 2021-08-14 PROCEDURE — 85652 RBC SED RATE AUTOMATED: CPT

## 2021-08-14 PROCEDURE — U0005: CPT

## 2021-08-14 PROCEDURE — 96375 TX/PRO/DX INJ NEW DRUG ADDON: CPT

## 2021-08-14 PROCEDURE — 80053 COMPREHEN METABOLIC PANEL: CPT

## 2021-08-14 PROCEDURE — 93010 ELECTROCARDIOGRAM REPORT: CPT

## 2021-08-14 PROCEDURE — U0003: CPT

## 2021-08-14 PROCEDURE — 36415 COLL VENOUS BLD VENIPUNCTURE: CPT

## 2021-08-14 PROCEDURE — 86140 C-REACTIVE PROTEIN: CPT

## 2021-08-14 PROCEDURE — 96374 THER/PROPH/DIAG INJ IV PUSH: CPT

## 2021-08-14 PROCEDURE — 87040 BLOOD CULTURE FOR BACTERIA: CPT

## 2021-08-14 PROCEDURE — 83605 ASSAY OF LACTIC ACID: CPT

## 2021-08-14 PROCEDURE — 99284 EMERGENCY DEPT VISIT MOD MDM: CPT

## 2021-08-14 RX ORDER — SODIUM CHLORIDE 9 MG/ML
3 INJECTION INTRAMUSCULAR; INTRAVENOUS; SUBCUTANEOUS ONCE
Refills: 0 | Status: COMPLETED | OUTPATIENT
Start: 2021-08-14 | End: 2021-08-14

## 2021-08-14 RX ORDER — VANCOMYCIN HCL 1 G
750 VIAL (EA) INTRAVENOUS ONCE
Refills: 0 | Status: COMPLETED | OUTPATIENT
Start: 2021-08-14 | End: 2021-08-14

## 2021-08-14 RX ORDER — CEFTRIAXONE 500 MG/1
1000 INJECTION, POWDER, FOR SOLUTION INTRAMUSCULAR; INTRAVENOUS ONCE
Refills: 0 | Status: COMPLETED | OUTPATIENT
Start: 2021-08-14 | End: 2021-08-14

## 2021-08-14 RX ORDER — AZTREONAM 2 G
1 VIAL (EA) INJECTION
Qty: 14 | Refills: 0
Start: 2021-08-14 | End: 2021-08-20

## 2021-08-14 RX ADMIN — CEFTRIAXONE 1000 MILLIGRAM(S): 500 INJECTION, POWDER, FOR SOLUTION INTRAMUSCULAR; INTRAVENOUS at 16:10

## 2021-08-14 RX ADMIN — Medication 250 MILLIGRAM(S): at 16:15

## 2021-08-14 RX ADMIN — SODIUM CHLORIDE 3 MILLILITER(S): 9 INJECTION INTRAMUSCULAR; INTRAVENOUS; SUBCUTANEOUS at 15:03

## 2021-08-14 NOTE — ED PROVIDER NOTE - OBJECTIVE STATEMENT
89 y/o female with a PMHx of  irregular heart rhythm, psoriasis, HLD, RA presents to the ED c/o cellulitis.  Fell into fish pond week ago scraping right foreleg with associated abrasions. Has been self administering local wound care noted increased redness and swelling for the  past 3 days.  Denies pain , fever, chills, n/v, SOB, CP, cough +TTP. 89 y/o female with a PMHx of  irregular heart rhythm, psoriasis, HLD, RA presents to the ED c/o cellulitis.  Fell into fish pond week ago scraping right foreleg with associated abrasions. Has been self administering local wound care; noted increased redness and swelling for the  past 3 days.  Denies pain , fever, chills, n/v, SOB, CP, cough +TTP.

## 2021-08-14 NOTE — ED PROVIDER NOTE - CONSTITUTIONAL, MLM
normal... Elderly white female, alert, in no respiratory distress, non toxic appearing, awake,  oriented to person, place, time/situation and in no apparent distress.

## 2021-08-14 NOTE — ED PROVIDER NOTE - PATIENT PORTAL LINK FT
You can access the FollowMyHealth Patient Portal offered by [facility] by registering at the following website: [email or web address]. By joining [facility]’s FollowMyHealth portal, you will also be able to view your health information using other applications (apps) compatible with our system.

## 2021-08-14 NOTE — ED PROVIDER NOTE - SKIN, MLM
Skin warm, dry and intact. No evidence of rash. Right foreleg +erythematous soft tissue swelling with associated tactile warmth.  +overlying cutaneous ulcerations w/o discharge.

## 2021-08-14 NOTE — ED PROVIDER NOTE - ENMT, MLM
Airway patent, Nasal mucosa clear. Mouth with normal mucosa. Throat has no vesicles, uvula is midline. OP clear.

## 2021-08-14 NOTE — ED ADULT NURSE NOTE - OBJECTIVE STATEMENT
Pt presents to ER c/o RLE swelling and redness. Pt reports she slipped and fell into fish pond at her house one week PTA. Pt reports she has been doing self wound care at home and symptoms became exacerbated over the past 3 days. Denies fever chills. AO x 3 oriented to baseline, normal breathing pattern with no difficulty.

## 2021-08-14 NOTE — ED PROVIDER NOTE - NSICDXPASTMEDICALHX_GEN_ALL_CORE_FT
PAST MEDICAL HISTORY:  Hyperlipidemia     Irregular heart rhythm     Psoriasis     Rheumatoid arthritis

## 2021-08-14 NOTE — ED PROVIDER NOTE - CLINICAL SUMMARY MEDICAL DECISION MAKING FREE TEXT BOX
89 y/o female with a PMHx of  irregular heart rhythm, psoriasis, HLD, RA  presents to  the  ED  with right lower leg cellulitis  extending to foot s/p 1 week fall into fish ponds. +NBI distally. +cellulitis. Does not  meet sepsis criteria.  Plan:  labs:  PAN culture, lactate, EKG, CXR,  IV abx. Observe and Reassess. 89 y/o female with a PMHx of  irregular heart rhythm, psoriasis, HLD, RA  presents to  the  ED  with right lower leg cellulitis  extending to foot s/p 1 week fall into fish ponds. +NVI distally. +cellulitis. Does not  meet sepsis criteria.    Plan:  labs:  PAN culture, lactate, EKG, CXR,  IV abx. Observe and Reassess.

## 2021-08-14 NOTE — ED PROVIDER NOTE - PROGRESS NOTE DETAILS
JAIME Ybarra MD:  Case signed out to Dr. Jha:  f/u labs, sandy. WBC & lactate, reassess.  Pt does not meet sepsis criteria, may be able to manage as outpt for LE cellulitis. JAIME Ybarra MD:  Case signed out to Dr. Jha:  IV Abx ordered, f/u labs, sandy. WBC & lactate, reassess.  Pt does not meet sepsis criteria, may be able to manage as outpt for LE cellulitis.

## 2021-08-14 NOTE — ED ADULT NURSE NOTE - NSIMPLEMENTINTERV_GEN_ALL_ED
Implemented All Fall Risk Interventions:  [place] to call system. Call bell, personal items and telephone within reach. Instruct patient to call for assistance. Room bathroom lighting operational. Non-slip footwear when patient is off stretcher. Physically safe environment: no spills, clutter or unnecessary equipment. Stretcher in lowest position, wheels locked, appropriate side rails in place. Provide visual cue, wrist band, yellow gown, etc. Monitor gait and stability. Monitor for mental status changes and reorient to person, place, and time. Review medications for side effects contributing to fall risk. Reinforce activity limits and safety measures with patient and family.

## 2021-08-15 LAB — CRP SERPL-MCNC: 86 MG/L — HIGH

## 2021-08-27 ENCOUNTER — EMERGENCY (EMERGENCY)
Facility: HOSPITAL | Age: 86
LOS: 0 days | Discharge: HOME | End: 2021-08-27
Attending: EMERGENCY MEDICINE
Payer: MEDICARE

## 2021-08-27 ENCOUNTER — TRANSCRIPTION ENCOUNTER (OUTPATIENT)
Age: 86
End: 2021-08-27

## 2021-08-27 VITALS
SYSTOLIC BLOOD PRESSURE: 150 MMHG | HEART RATE: 78 BPM | RESPIRATION RATE: 16 BRPM | OXYGEN SATURATION: 96 % | TEMPERATURE: 98 F | DIASTOLIC BLOOD PRESSURE: 83 MMHG

## 2021-08-27 VITALS — WEIGHT: 106.92 LBS

## 2021-08-27 DIAGNOSIS — E78.5 HYPERLIPIDEMIA, UNSPECIFIED: ICD-10-CM

## 2021-08-27 DIAGNOSIS — L03.115 CELLULITIS OF RIGHT LOWER LIMB: ICD-10-CM

## 2021-08-27 DIAGNOSIS — Z90.13 ACQUIRED ABSENCE OF BILATERAL BREASTS AND NIPPLES: Chronic | ICD-10-CM

## 2021-08-27 DIAGNOSIS — M79.89 OTHER SPECIFIED SOFT TISSUE DISORDERS: ICD-10-CM

## 2021-08-27 DIAGNOSIS — Z86.79 PERSONAL HISTORY OF OTHER DISEASES OF THE CIRCULATORY SYSTEM: ICD-10-CM

## 2021-08-27 DIAGNOSIS — Z79.899 OTHER LONG TERM (CURRENT) DRUG THERAPY: ICD-10-CM

## 2021-08-27 DIAGNOSIS — M79.661 PAIN IN RIGHT LOWER LEG: ICD-10-CM

## 2021-08-27 DIAGNOSIS — Z88.8 ALLERGY STATUS TO OTHER DRUGS, MEDICAMENTS AND BIOLOGICAL SUBSTANCES: ICD-10-CM

## 2021-08-27 DIAGNOSIS — Z20.822 CONTACT WITH AND (SUSPECTED) EXPOSURE TO COVID-19: ICD-10-CM

## 2021-08-27 DIAGNOSIS — L40.50 ARTHROPATHIC PSORIASIS, UNSPECIFIED: ICD-10-CM

## 2021-08-27 DIAGNOSIS — Z79.82 LONG TERM (CURRENT) USE OF ASPIRIN: ICD-10-CM

## 2021-08-27 DIAGNOSIS — L53.9 ERYTHEMATOUS CONDITION, UNSPECIFIED: ICD-10-CM

## 2021-08-27 LAB
ALBUMIN SERPL ELPH-MCNC: 3.2 G/DL — LOW (ref 3.3–5)
ALP SERPL-CCNC: 141 U/L — HIGH (ref 40–120)
ALT FLD-CCNC: 20 U/L — SIGNIFICANT CHANGE UP (ref 12–78)
ANION GAP SERPL CALC-SCNC: 2 MMOL/L — LOW (ref 5–17)
AST SERPL-CCNC: 21 U/L — SIGNIFICANT CHANGE UP (ref 15–37)
BASOPHILS # BLD AUTO: 0.04 K/UL — SIGNIFICANT CHANGE UP (ref 0–0.2)
BASOPHILS NFR BLD AUTO: 0.8 % — SIGNIFICANT CHANGE UP (ref 0–2)
BILIRUB SERPL-MCNC: 0.3 MG/DL — SIGNIFICANT CHANGE UP (ref 0.2–1.2)
BUN SERPL-MCNC: 13 MG/DL — SIGNIFICANT CHANGE UP (ref 7–23)
CALCIUM SERPL-MCNC: 8.4 MG/DL — LOW (ref 8.5–10.1)
CHLORIDE SERPL-SCNC: 104 MMOL/L — SIGNIFICANT CHANGE UP (ref 96–108)
CO2 SERPL-SCNC: 30 MMOL/L — SIGNIFICANT CHANGE UP (ref 22–31)
CREAT SERPL-MCNC: 0.64 MG/DL — SIGNIFICANT CHANGE UP (ref 0.5–1.3)
EOSINOPHIL # BLD AUTO: 0.09 K/UL — SIGNIFICANT CHANGE UP (ref 0–0.5)
EOSINOPHIL NFR BLD AUTO: 1.7 % — SIGNIFICANT CHANGE UP (ref 0–6)
GLUCOSE SERPL-MCNC: 107 MG/DL — HIGH (ref 70–99)
HCT VFR BLD CALC: 36.5 % — SIGNIFICANT CHANGE UP (ref 34.5–45)
HGB BLD-MCNC: 11.4 G/DL — LOW (ref 11.5–15.5)
IMM GRANULOCYTES NFR BLD AUTO: 0.6 % — SIGNIFICANT CHANGE UP (ref 0–1.5)
LYMPHOCYTES # BLD AUTO: 1.31 K/UL — SIGNIFICANT CHANGE UP (ref 1–3.3)
LYMPHOCYTES # BLD AUTO: 24.8 % — SIGNIFICANT CHANGE UP (ref 13–44)
MCHC RBC-ENTMCNC: 28.3 PG — SIGNIFICANT CHANGE UP (ref 27–34)
MCHC RBC-ENTMCNC: 31.2 GM/DL — LOW (ref 32–36)
MCV RBC AUTO: 90.6 FL — SIGNIFICANT CHANGE UP (ref 80–100)
MONOCYTES # BLD AUTO: 0.43 K/UL — SIGNIFICANT CHANGE UP (ref 0–0.9)
MONOCYTES NFR BLD AUTO: 8.1 % — SIGNIFICANT CHANGE UP (ref 2–14)
NEUTROPHILS # BLD AUTO: 3.39 K/UL — SIGNIFICANT CHANGE UP (ref 1.8–7.4)
NEUTROPHILS NFR BLD AUTO: 64 % — SIGNIFICANT CHANGE UP (ref 43–77)
PLATELET # BLD AUTO: 272 K/UL — SIGNIFICANT CHANGE UP (ref 150–400)
POTASSIUM SERPL-MCNC: 4 MMOL/L — SIGNIFICANT CHANGE UP (ref 3.5–5.3)
POTASSIUM SERPL-SCNC: 4 MMOL/L — SIGNIFICANT CHANGE UP (ref 3.5–5.3)
PROT SERPL-MCNC: 6.4 GM/DL — SIGNIFICANT CHANGE UP (ref 6–8.3)
RBC # BLD: 4.03 M/UL — SIGNIFICANT CHANGE UP (ref 3.8–5.2)
RBC # FLD: 14.3 % — SIGNIFICANT CHANGE UP (ref 10.3–14.5)
SODIUM SERPL-SCNC: 136 MMOL/L — SIGNIFICANT CHANGE UP (ref 135–145)
WBC # BLD: 5.29 K/UL — SIGNIFICANT CHANGE UP (ref 3.8–10.5)
WBC # FLD AUTO: 5.29 K/UL — SIGNIFICANT CHANGE UP (ref 3.8–10.5)

## 2021-08-27 PROCEDURE — 93971 EXTREMITY STUDY: CPT | Mod: RT

## 2021-08-27 PROCEDURE — 80053 COMPREHEN METABOLIC PANEL: CPT

## 2021-08-27 PROCEDURE — 93971 EXTREMITY STUDY: CPT | Mod: 26,RT

## 2021-08-27 PROCEDURE — 99284 EMERGENCY DEPT VISIT MOD MDM: CPT

## 2021-08-27 PROCEDURE — 36415 COLL VENOUS BLD VENIPUNCTURE: CPT

## 2021-08-27 PROCEDURE — 99284 EMERGENCY DEPT VISIT MOD MDM: CPT | Mod: 25

## 2021-08-27 PROCEDURE — 85025 COMPLETE CBC W/AUTO DIFF WBC: CPT

## 2021-08-27 RX ADMIN — Medication 100 MILLIGRAM(S): at 22:45

## 2021-08-27 NOTE — ED STATDOCS - ATTENDING CONTRIBUTION TO CARE
I, Karin Hallman MD,  performed the initial face to face bedside interview with this patient regarding history of present illness, review of symptoms and relevant past medical, social and family history.  I completed an independent physical examination.  I was the initial provider who evaluated this patient. I have signed out the follow up of any pending tests (i.e. labs, radiological studies) to the ACP.  I have communicated the patient’s plan of care and disposition with the ACP.  The history, relevant review of systems, past medical and surgical history, medical decision making, and physical examination was documented by the scribe in my presence and I attest to the accuracy of the documentation.

## 2021-08-27 NOTE — ED STATDOCS - NSFOLLOWUPINSTRUCTIONS_ED_ALL_ED_FT
GIVEN THAT YOUR LABS AND ULTRASOUND ARE NORMAL AND YOU WOULD PREFER TO GO HOME TODAY, A DIFFERENT ANTIBIOTIC WAS SENT TO THE PHARMACY.  TAKE THIS AS DIRECTED.  IF THERE IS NO IMPROVEMENT IN 3 DAYS OR IF THE REDNESS AND SWELLING ARE GETTING ANY WORSE, YOU DEVELOP A FEVER OR CHILLS, PLEASE IMMEDIATELY RETURN TO THE ER FOR ADMISSION FOR IV ANTIBIOTICS.        Cellulitis    WHAT YOU NEED TO KNOW:    Cellulitis is a skin infection caused by bacteria. Cellulitis may go away on its own or you may need treatment. Your healthcare provider may draw a [place] around the outside edges of your cellulitis. If your cellulitis spreads, your healthcare provider will see it outside of the [place]. Cellulitis          DISCHARGE INSTRUCTIONS:    Call 911 if:     You have sudden trouble breathing or chest pain.        Return to the emergency department if:     Your wound gets larger and more painful.       You feel a crackling under your skin when you touch it.      You have purple dots or bumps on your skin, or you see bleeding under your skin.      You have new swelling and pain in your legs.      The red, warm, swollen area gets larger.      You see red streaks coming from the infected area.    Contact your healthcare provider if:     You have a fever.      Your fever or pain does not go away or gets worse.      The area does not get smaller after 2 days of antibiotics.      Your skin is flaking or peeling off.      You have questions or concerns about your condition or care.    Medicines:     Antibiotics help treat the bacterial infection.       NSAIDs, such as ibuprofen, help decrease swelling, pain, and fever. NSAIDs can cause stomach bleeding or kidney problems in certain people. If you take blood thinner medicine, always ask if NSAIDs are safe for you. Always read the medicine label and follow directions. Do not give these medicines to children under 6 months of age without direction from your child's healthcare provider.      Acetaminophen decreases pain and fever. It is available without a doctor's order. Ask how much to take and how often to take it. Follow directions. Read the labels of all other medicines you are using to see if they also contain acetaminophen, or ask your doctor or pharmacist. Acetaminophen can cause liver damage if not taken correctly. Do not use more than 4 grams (4,000 milligrams) total of acetaminophen in one day.       Take your medicine as directed. Contact your healthcare provider if you think your medicine is not helping or if you have side effects. Tell him or her if you are allergic to any medicine. Keep a list of the medicines, vitamins, and herbs you take. Include the amounts, and when and why you take them. Bring the list or the pill bottles to follow-up visits. Carry your medicine list with you in case of an emergency.    Self-care:     Elevate the area above the level of your heart as often as you can. This will help decrease swelling and pain. Prop the area on pillows or blankets to keep it elevated comfortably.       Clean the area daily until the wound scabs over. Gently wash the area with soap and water. Pat dry. Use dressings as directed.       Place cool or warm, wet cloths on the area as directed. Use clean cloths and clean water. Leave it on the area until the cloth is room temperature. Pat the area dry with a clean, dry cloth. The cloths may help decrease pain.     Prevent cellulitis:     Do not scratch bug bites or areas of injury. You increase your risk for cellulitis by scratching these areas.       Do not share personal items, such as towels, clothing, and razors.       Clean exercise equipment with germ-killing  before and after you use it.      Wash your hands often. Use soap and water. Wash your hands after you use the bathroom, change a child's diapers, or sneeze. Wash your hands before you prepare or eat food. Use lotion to prevent dry, cracked skin. Handwashing           Wear pressure stockings as directed. You may be told to wear the stockings if you have peripheral edema. The stockings improve blood flow and decrease swelling.      Treat athlete’s foot. This can help prevent the spread of a bacterial skin infection.    Follow up with your healthcare provider within 3 days, or as directed: Your healthcare provider will check if your cellulitis is getting better. You may need different medicine. Write down your questions so you remember to ask them during your visits.

## 2021-08-27 NOTE — ED ADULT TRIAGE NOTE - CHIEF COMPLAINT QUOTE
Pt presents to ER c/o RLE pain/infection. Pt reports falling into pond 2 weeks PTA. Pt received abx after event. Pt sent from urgent care to r/o cellulitis

## 2021-08-27 NOTE — ED STATDOCS - OBJECTIVE STATEMENT
89 y/o F with a PMHx of HLD, irregular heart rhythm, psoriasis, RA presents to the ED c/o R lower leg pain after falling into a fish pond 2 weeks ago. States her R lower leg is erythematous and swollen. No fever. Pt takes Aspirin daily. Pt was here 2 weeks ago in [facility] ED to receive IV abx and dc home. S

## 2021-08-27 NOTE — ED STATDOCS - PROGRESS NOTE DETAILS
Patient seen and evaluated s/p workup.  Negative doppler, labs unremarkable, however patient has failed PO abx of bactrim.  Offered admission for IV abx given this, patient would prefer to trial a different course of antibiotics outpatient with very strict return precautions reviewed -Mary Anne Darling PA-C

## 2021-10-08 NOTE — PHYSICAL THERAPY INITIAL EVALUATION ADULT - MD/RN NOTIFIED
[FreeTextEntry1] : Patient in office for follow up HLD [de-identified] : not working since 08/02 as she has left shoulder pain.seeing orthopedic\par need lab\par declined flu vaccine no yes

## 2024-05-17 NOTE — PATIENT PROFILE ADULT - NSPRONUTRITIONRISK_GEN_A_NUR
Activity instructions  Avoid long travel until followup visit.  Avoid strenuous activities that bend or flex the skin and muscle near your incision until after your follow-up visit.  Do make any important decisions or sign any legal documents the day of surgery.  No heavy lifting greater than 20 pounds for 6 weeks after surgery.  Walking and activities of self-care are encouraged immediately after surgery.  You may drive if you are off opioid pain medications and are able to perform the activities needed to drive safely.      
No indicators present

## 2024-07-23 ENCOUNTER — RESULT REVIEW (OUTPATIENT)
Age: 89
End: 2024-07-23

## 2024-07-26 ENCOUNTER — TRANSCRIPTION ENCOUNTER (OUTPATIENT)
Age: 89
End: 2024-07-26

## 2024-08-05 ENCOUNTER — NON-APPOINTMENT (OUTPATIENT)
Age: 89
End: 2024-08-05

## 2024-08-05 ENCOUNTER — APPOINTMENT (OUTPATIENT)
Dept: SURGERY | Facility: CLINIC | Age: 89
End: 2024-08-05

## 2024-08-05 PROCEDURE — 99211 OFF/OP EST MAY X REQ PHY/QHP: CPT
